# Patient Record
Sex: FEMALE | Race: BLACK OR AFRICAN AMERICAN | Employment: UNEMPLOYED | URBAN - METROPOLITAN AREA
[De-identification: names, ages, dates, MRNs, and addresses within clinical notes are randomized per-mention and may not be internally consistent; named-entity substitution may affect disease eponyms.]

---

## 2017-02-28 ENCOUNTER — OFFICE VISIT (OUTPATIENT)
Dept: FAMILY MEDICINE CLINIC | Age: 42
End: 2017-02-28

## 2017-02-28 VITALS
HEIGHT: 59 IN | RESPIRATION RATE: 18 BRPM | BODY MASS INDEX: 25.08 KG/M2 | TEMPERATURE: 97.8 F | DIASTOLIC BLOOD PRESSURE: 75 MMHG | OXYGEN SATURATION: 98 % | SYSTOLIC BLOOD PRESSURE: 108 MMHG | WEIGHT: 124.38 LBS | HEART RATE: 76 BPM

## 2017-02-28 DIAGNOSIS — Z00.00 ROUTINE GENERAL MEDICAL EXAMINATION AT A HEALTH CARE FACILITY: Primary | ICD-10-CM

## 2017-02-28 DIAGNOSIS — Z23 ENCOUNTER FOR IMMUNIZATION: ICD-10-CM

## 2017-02-28 DIAGNOSIS — R05.9 COUGH: ICD-10-CM

## 2017-02-28 NOTE — MR AVS SNAPSHOT
Visit Information Date & Time Provider Department Dept. Phone Encounter #  
 2/28/2017  8:30 AM Arvind Emery MD [address] [facility] [phone] [id number] Upcoming Health Maintenance Date Due DTaP/Tdap/Td series (1 - Tdap) 1/26/1996 PAP AKA CERVICAL CYTOLOGY 11/1/2019 Allergies as of 2/28/2017  Review Complete On: 2/28/2017 By: Nallely Jarrell MD  
 No Known Allergies Current Immunizations  Reviewed on 2/28/2017 Name Date Influenza Vaccine 11/1/2016 Tdap  Incomplete Reviewed by Nallely Jarrell MD on 2/28/2017 at  9:28 AM  
You Were Diagnosed With   
  
 Codes Comments Routine general medical examination at a health care facility    -  Primary ICD-10-CM: Z00.00 ICD-9-CM: V70.0 Encounter for immunization     ICD-10-CM: K42 ICD-9-CM: V03.89 Cough     ICD-10-CM: R05 ICD-9-CM: 853. 2 Vitals BP  
  
  
  
  
  
 108/75 (BP 1 Location: Left arm, BP Patient Position: Sitting) Vitals History BMI and BSA Data Body Mass Index Body Surface Area  
 25.12 kg/m 2 1.53 m 2 Preferred Pharmacy Pharmacy Name Phone [id number], [address] [place] [address] [phone] Your Updated Medication List  
  
Notice  As of 2/28/2017  9:34 AM  
 You have not been prescribed any medications. We Performed the Following REFERRAL TO ALLERGY [REF5 Custom] TETANUS, DIPHTHERIA TOXOIDS AND ACELLULAR PERTUSSIS VACCINE (TDAP), IN INDIVIDS. >=7, IM [id number] CPT(R)] Referral Information Referral ID Referred By Referred To [id number] Nabeel Tucker MD   
   [address] [place] [address] Phone: [phone] Fax: [phone] Visits Status Start Date End Date 1 New Request 2/28/17 2/28/18  If your referral has a status of pending review or denied, additional information will be sent to support the outcome of this decision. Introducing [address]! Dear Niesha Hannon: 
Thank you for requesting a [employer] account. Our records indicate that you already have an active [employer] account. You can access your account anytime at [email or web address] Did you know that you can access your hospital and ER discharge instructions at any time in [employer]? You can also review all of your test results from your hospital stay or ER visit. Additional Information If you have questions, please visit the Frequently Asked Questions section of the [employer] website at [email or web address] Remember, [employer] is NOT to be used for urgent needs. For medical emergencies, dial 911. Now available from your iPhone and Android! Please provide this summary of care documentation to your next provider. Your primary care clinician is listed as Pretty Emery. If you have any questions after today's visit, please call [phone].

## 2017-02-28 NOTE — PROGRESS NOTES
1. Have you been to the ER, urgent care clinic since your last visit? Hospitalized since your last visit? No    2. Have you seen or consulted any other health care providers outside of the [address] [place] [address] since your last visit? Include any pap smears or colon screening.  No   Chief Complaint   Patient presents with    Complete Physical     CPE_ cpe    Labs     Labs- labs- pt is fasting

## 2017-02-28 NOTE — PROGRESS NOTES
1. Have you been to the ER, urgent care clinic since your last visit? Hospitalized since your last visit? No    2. Have you seen or consulted any other health care providers outside of the [address] [place] [address] since your last visit? Include any pap smears or colon screening. No   Chief Complaint   Patient presents with    Complete Physical     CPE_ cpe    Labs     Labs- labs- pt is fasting         This is a Subsequent Medicare Annual Wellness Visit providing Personalized Prevention Plan Services (PPPS) (Performed 12 months after initial AWV and PPPS )    I have reviewed the patient's medical history in detail and updated the computerized patient record. History     Past Medical History:   Diagnosis Date    Moderate mental retardation       History reviewed. No pertinent surgical history. No Known Allergies  Family History   Problem Relation Age of Onset    Family history unknown: Yes     Social History   Substance Use Topics    Smoking status: Never Smoker    Smokeless tobacco: Never Used    Alcohol use No     There is no problem list on file for this patient. Depression Risk Factor Screening:     PHQ 2 / 9, over the last two weeks 2/28/2017   Little interest or pleasure in doing things Not at all   Feeling down, depressed or hopeless Not at all   Total Score PHQ 2 0     Alcohol Risk Factor Screening:   Pt drinks very rarely    Functional Ability and Level of Safety:     Hearing Loss   normal-to-mild    Activities of Daily Living   Self-care. Requires assistance with: no ADLs    Fall Risk   No flowsheet data found. Abuse Screen   Patient is not abused    Review of Systems   A comprehensive review of systems was negative except for that written in the HPI.     Physical Examination     Evaluation of Cognitive Function:  Mood/affect:  happy  Appearance: age appropriate  Family member/caregiver input: Pt lives with her parents    Visit Vitals    /75 (BP 1 Location: Left arm, BP Patient Position: Sitting)    Pulse 76    Temp 97.8 °F (36.6 °C) (Oral)    Resp 18    Ht 4' 11\" (1.499 m)    Wt 124 lb 6 oz (56.4 kg)    SpO2 98%    BMI 25.12 kg/m2     General appearance: alert, cooperative, no distress, appears stated age  Head: Normocephalic, without obvious abnormality, atraumatic  Throat: Lips, mucosa, and tongue normal. Teeth and gums normal  Lungs: clear to auscultation bilaterally  Heart: regular rate and rhythm, S1, S2 normal, no murmur, click, rub or gallop  Extremities: extremities normal, atraumatic, no cyanosis or edema    Patient Care Team:  August Tinsley MD as PCP - General (Family Practice)    Advice/Referrals/Counseling   Education and counseling provided:  Are appropriate based on today's review and evaluation      Assessment/Plan       ICD-10-CM ICD-9-CM    1. Routine general medical examination at a health care facility Z00.00 V70.0    2. Encounter for immunization Z23 V03.89 TETANUS, DIPHTHERIA TOXOIDS AND ACELLULAR PERTUSSIS VACCINE (TDAP), IN INDIVIDS. >=7, IM   3. Cough R05 786.2 REFERRAL TO ALLERGY     Encounter Diagnoses   Name Primary?  Routine general medical examination at a health care facility Yes    Encounter for immunization     Cough      Orders Placed This Encounter    Tetanus, diphtheria toxoids and acellular pertussis (TDAP) vaccine, in individuals >=7 years, IM    REFERRAL TO ALLERGY   .

## 2017-03-01 LAB
25(OH)D3+25(OH)D2 SERPL-MCNC: 58.5 NG/ML (ref 30–100)
ALBUMIN SERPL-MCNC: 4.6 G/DL (ref 3.5–5.5)
ALBUMIN/GLOB SERPL: 2.1 {RATIO} (ref 1.1–2.5)
ALP SERPL-CCNC: 68 IU/L (ref 39–117)
ALT SERPL-CCNC: 13 IU/L (ref 0–32)
AST SERPL-CCNC: 16 IU/L (ref 0–40)
BASOPHILS # BLD AUTO: 0 X10E3/UL (ref 0–0.2)
BASOPHILS NFR BLD AUTO: 1 %
BILIRUB SERPL-MCNC: 1.3 MG/DL (ref 0–1.2)
BUN SERPL-MCNC: 10 MG/DL (ref 6–24)
BUN/CREAT SERPL: 12 (ref 9–23)
CALCIUM SERPL-MCNC: 9.4 MG/DL (ref 8.7–10.2)
CHLORIDE SERPL-SCNC: 100 MMOL/L (ref 96–106)
CHOLEST SERPL-MCNC: 225 MG/DL (ref 100–199)
CO2 SERPL-SCNC: 23 MMOL/L (ref 18–29)
CREAT SERPL-MCNC: 0.86 MG/DL (ref 0.57–1)
EOSINOPHIL # BLD AUTO: 0 X10E3/UL (ref 0–0.4)
EOSINOPHIL NFR BLD AUTO: 1 %
ERYTHROCYTE [DISTWIDTH] IN BLOOD BY AUTOMATED COUNT: 12.9 % (ref 12.3–15.4)
EST. AVERAGE GLUCOSE BLD GHB EST-MCNC: 100 MG/DL
GLOBULIN SER CALC-MCNC: 2.2 G/DL (ref 1.5–4.5)
GLUCOSE SERPL-MCNC: 77 MG/DL (ref 65–99)
HBA1C MFR BLD: 5.1 % (ref 4.8–5.6)
HCT VFR BLD AUTO: 41.9 % (ref 34–46.6)
HDLC SERPL-MCNC: 83 MG/DL
HGB BLD-MCNC: 13.6 G/DL (ref 11.1–15.9)
IMM GRANULOCYTES # BLD: 0 X10E3/UL (ref 0–0.1)
IMM GRANULOCYTES NFR BLD: 0 %
INTERPRETATION, 910389: NORMAL
LDLC SERPL CALC-MCNC: 132 MG/DL (ref 0–99)
LYMPHOCYTES # BLD AUTO: 1.8 X10E3/UL (ref 0.7–3.1)
LYMPHOCYTES NFR BLD AUTO: 49 %
MCH RBC QN AUTO: 31.3 PG (ref 26.6–33)
MCHC RBC AUTO-ENTMCNC: 32.5 G/DL (ref 31.5–35.7)
MCV RBC AUTO: 97 FL (ref 79–97)
MONOCYTES # BLD AUTO: 0.3 X10E3/UL (ref 0.1–0.9)
MONOCYTES NFR BLD AUTO: 8 %
NEUTROPHILS # BLD AUTO: 1.5 X10E3/UL (ref 1.4–7)
NEUTROPHILS NFR BLD AUTO: 41 %
PLATELET # BLD AUTO: 357 X10E3/UL (ref 150–379)
POTASSIUM SERPL-SCNC: 4.8 MMOL/L (ref 3.5–5.2)
PROT SERPL-MCNC: 6.8 G/DL (ref 6–8.5)
RBC # BLD AUTO: 4.34 X10E6/UL (ref 3.77–5.28)
SODIUM SERPL-SCNC: 138 MMOL/L (ref 134–144)
TRIGL SERPL-MCNC: 50 MG/DL (ref 0–149)
TSH SERPL DL<=0.005 MIU/L-ACNC: 1.48 UIU/ML (ref 0.45–4.5)
VLDLC SERPL CALC-MCNC: 10 MG/DL (ref 5–40)
WBC # BLD AUTO: 3.7 X10E3/UL (ref 3.4–10.8)

## 2017-03-01 NOTE — PROGRESS NOTES
Cholesterol is elevated, please closely monitor diet and increase exercise, recheck in 6 months. All other labs are within normal limits. A message has been sent in "[employer]" and the lab work released to the patient.

## 2017-06-07 PROBLEM — E78.00 ELEVATED CHOLESTEROL: Status: ACTIVE | Noted: 2017-06-07

## 2018-02-28 ENCOUNTER — OFFICE VISIT (OUTPATIENT)
Dept: FAMILY MEDICINE CLINIC | Age: 43
End: 2018-02-28

## 2018-02-28 VITALS
SYSTOLIC BLOOD PRESSURE: 106 MMHG | DIASTOLIC BLOOD PRESSURE: 70 MMHG | HEART RATE: 68 BPM | HEIGHT: 59 IN | RESPIRATION RATE: 17 BRPM | OXYGEN SATURATION: 99 % | WEIGHT: 117 LBS | TEMPERATURE: 98 F | BODY MASS INDEX: 23.59 KG/M2

## 2018-02-28 DIAGNOSIS — Z00.00 MEDICARE ANNUAL WELLNESS VISIT, SUBSEQUENT: ICD-10-CM

## 2018-02-28 DIAGNOSIS — E78.00 ELEVATED CHOLESTEROL: Primary | ICD-10-CM

## 2018-02-28 RX ORDER — MELATONIN
DAILY
COMMUNITY

## 2018-02-28 NOTE — PROGRESS NOTES
Chief Complaint   Patient presents with    Complete Physical     form for special olympics.      Labs

## 2018-02-28 NOTE — MR AVS SNAPSHOT
[address] [address] 
[phone] Patient: Bautista Mitchell MRN: [id number] :1975 Visit Information Date & Time Provider Department Dept. Phone Encounter #  
 2018  8:50 AM Cass Mosqueda MD [address] [facility] [phone] [id number] Upcoming Health Maintenance Date Due  
 PAP AKA CERVICAL CYTOLOGY 2019 DTaP/Tdap/Td series (2 - Td) 2027 Allergies as of 2018  Review Complete On: 2018 By: Cass Mosqueda MD  
 No Known Allergies Current Immunizations  Reviewed on 2018 Name Date Influenza Vaccine 10/29/2017, 10/11/2017, 2016 Tdap 2017 Reviewed by Cass Mosqueda MD on 2018 at  9:19 AM  
You Were Diagnosed With   
  
 Codes Comments Elevated cholesterol    -  Primary ICD-10-CM: E78.00 ICD-9-CM: 272.0 Medicare annual wellness visit, subsequent     ICD-10-CM: Z00.00 ICD-9-CM: V70.0 Vitals BP Pulse Temp Resp Height(growth percentile) Weight(growth percentile) 106/70 68 98 °F (36.7 °C) 17 4' 11\" (1.499 m) 117 lb (53.1 kg) SpO2 BMI OB Status Smoking Status 99% 23.63 kg/m2 IUD Never Smoker Vitals History BMI and BSA Data Body Mass Index Body Surface Area  
 23.63 kg/m 2 1.49 m 2 Preferred Pharmacy Pharmacy Name Phone [id number] [place], [address] [facility] [phone] Your Updated Medication List  
  
   
This list is accurate as of 18  9:24 AM.  Always use your most recent med list.  
  
  
  
  
 VITAMIN D3 1,000 unit tablet Generic drug:  cholecalciferol Take  by mouth daily. We Performed the Following CBC WITH AUTOMATED DIFF [98991 CPT(R)] LIPID PANEL [70862 CPT(R)] METABOLIC PANEL, COMPREHENSIVE [04040 CPT(R)] TSH 3RD GENERATION [76461 CPT(R)] Patient Instructions Medicare Wellness Visit, Female The best way to live healthy is to have a healthy lifestyle by eating a well-balanced diet, exercising regularly, limiting alcohol and stopping smoking. Regular physical exams and screening tests are another way to keep healthy. Preventive exams provided by your health care provider can find health problems before they become diseases or illnesses. Preventive services including immunizations, screening tests, monitoring and exams can help you take care of your own health. All people over age 72 should have a pneumovax  and and a prevnar shot to prevent pneumonia. These are once in a lifetime unless you and your provider decide differently. All people over 65 should have a yearly flu shot and a tetanus vaccine every 10 years. A bone mass density to screen for osteoporosis or thinning of the bones should be done every 2 years after 65. Screening for diabetes mellitus with a blood sugar test should be done every year. Glaucoma is a disease of the eye due to increased ocular pressure that can lead to blindness and it should be done every year by an eye professional. 
 
Cardiovascular screening tests that check for elevated lipids (fatty part of blood) which can lead to heart disease and strokes should be done every 5 years. Colorectal screening that evaluates for blood or polyps in your colon should be done yearly as a stool test or every five years as a flexible sigmoidoscope or every 10 years as a colonoscopy up to age 76. Breast cancer screening with a mammogram is recommended biennially  for women age 54-69. Screening for cervical cancer with a pap smear and pelvic exam is recommended for women after age 72 years every 2 years up to age 79 or when the provider and patient decide to stop. If there is a history of cervical abnormalities or other increased risk for cancer then the test is recommended yearly. Hepatitis C screening is also recommended for anyone born between 80 through [address]. A shingles vaccine is also recommended once in a lifetime after age 61. Your Medicare Wellness Exam is recommended annually. Here is a list of your current Health Maintenance items with a due date: There are no preventive care reminders to display for this patient. Introducing [facility]! Dear Omar Bah: 
Thank you for requesting a [employer] account. Our records indicate that you already have an active [employer] account. You can access your account anytime at [email or web address] Did you know that you can access your hospital and ER discharge instructions at any time in [employer]? You can also review all of your test results from your hospital stay or ER visit. Additional Information If you have questions, please visit the Frequently Asked Questions section of the [employer] website at [email or web address] Remember, [employer] is NOT to be used for urgent needs. For medical emergencies, dial 911. Now available from your iPhone and Android! Please provide this summary of care documentation to your next provider. Your primary care clinician is listed as Pretty Emery. If you have any questions after today's visit, please call [phone].

## 2018-02-28 NOTE — PROGRESS NOTES
Chief Complaint   Patient presents with    Complete Physical     form for special olympics.  Labs          This is a Subsequent Medicare Annual Wellness Exam (AWV) (Performed 12 months after IPPE or effective date of Medicare Part B enrollment)    I have reviewed the patient's medical history in detail and updated the computerized patient record. History     Past Medical History:   Diagnosis Date    Moderate mental retardation       History reviewed. No pertinent surgical history. Current Outpatient Prescriptions   Medication Sig Dispense Refill    cholecalciferol (VITAMIN D3) 1,000 unit tablet Take  by mouth daily. No Known Allergies  Family History   Problem Relation Age of Onset    Family history unknown: Yes     Social History   Substance Use Topics    Smoking status: Never Smoker    Smokeless tobacco: Never Used    Alcohol use No     Patient Active Problem List   Diagnosis Code    Elevated cholesterol E78.00       Depression Risk Factor Screening:     PHQ over the last two weeks 2/28/2018   Little interest or pleasure in doing things Not at all   Feeling down, depressed or hopeless Not at all   Total Score PHQ 2 0     Alcohol Risk Factor Screening: You do not drink alcohol or very rarely. Functional Ability and Level of Safety:   Hearing Loss  Hearing is good. Activities of Daily Living  The home contains: no safety equipment. Patient needs help with:  transportation, preparing meals, managing medications and managing money    Fall Risk  No flowsheet data found.     Abuse Screen  Patient is not abused    Cognitive Screening   Evaluation of Cognitive Function:  Has your family/caregiver stated any concerns about your memory: no  Normal    Patient Care Team   Patient Care Team:  Jose Miguel Parham MD as PCP - General (Family Practice)    Physical Examination: General appearance - alert, well appearing, and in no distress  Mental status - normal mood, behavior, speech, dress, motor activity, and thought processes  Mouth - mucous membranes moist, pharynx normal without lesions  Chest - clear to auscultation, no wheezes, rales or rhonchi, symmetric air entry  Heart - normal rate, regular rhythm, normal S1, S2, no murmurs, rubs, clicks or gallops  Musculoskeletal - no joint tenderness, deformity or swelling    Assessment/Plan   Education and counseling provided:  Are appropriate based on today's review and evaluation    Diagnoses and all orders for this visit:    1. Elevated cholesterol  -     METABOLIC PANEL, COMPREHENSIVE  -     CBC WITH AUTOMATED DIFF  -     LIPID PANEL  -     TSH 3RD GENERATION    2. Medicare annual wellness visit, subsequent        There are no preventive care reminders to display for this patient.

## 2018-03-01 LAB
ALBUMIN SERPL-MCNC: 4.4 G/DL (ref 3.5–5.5)
ALBUMIN/GLOB SERPL: 1.8 {RATIO} (ref 1.2–2.2)
ALP SERPL-CCNC: 59 IU/L (ref 39–117)
ALT SERPL-CCNC: 13 IU/L (ref 0–32)
AST SERPL-CCNC: 15 IU/L (ref 0–40)
BASOPHILS # BLD AUTO: 0 X10E3/UL (ref 0–0.2)
BASOPHILS NFR BLD AUTO: 1 %
BILIRUB SERPL-MCNC: 1.2 MG/DL (ref 0–1.2)
BUN SERPL-MCNC: 11 MG/DL (ref 6–24)
BUN/CREAT SERPL: 12 (ref 9–23)
CALCIUM SERPL-MCNC: 9.4 MG/DL (ref 8.7–10.2)
CHLORIDE SERPL-SCNC: 99 MMOL/L (ref 96–106)
CHOLEST SERPL-MCNC: 180 MG/DL (ref 100–199)
CO2 SERPL-SCNC: 24 MMOL/L (ref 18–29)
CREAT SERPL-MCNC: 0.94 MG/DL (ref 0.57–1)
EOSINOPHIL # BLD AUTO: 0.1 X10E3/UL (ref 0–0.4)
EOSINOPHIL NFR BLD AUTO: 2 %
ERYTHROCYTE [DISTWIDTH] IN BLOOD BY AUTOMATED COUNT: 13.1 % (ref 12.3–15.4)
GFR SERPLBLD CREATININE-BSD FMLA CKD-EPI: 75 ML/MIN/1.73
GFR SERPLBLD CREATININE-BSD FMLA CKD-EPI: 86 ML/MIN/1.73
GLOBULIN SER CALC-MCNC: 2.4 G/DL (ref 1.5–4.5)
GLUCOSE SERPL-MCNC: 79 MG/DL (ref 65–99)
HCT VFR BLD AUTO: 41.3 % (ref 34–46.6)
HDLC SERPL-MCNC: 75 MG/DL
HGB BLD-MCNC: 13.6 G/DL (ref 11.1–15.9)
IMM GRANULOCYTES # BLD: 0 X10E3/UL (ref 0–0.1)
IMM GRANULOCYTES NFR BLD: 0 %
INTERPRETATION, 910389: NORMAL
LDLC SERPL CALC-MCNC: 94 MG/DL (ref 0–99)
LYMPHOCYTES # BLD AUTO: 2.2 X10E3/UL (ref 0.7–3.1)
LYMPHOCYTES NFR BLD AUTO: 54 %
MCH RBC QN AUTO: 31.1 PG (ref 26.6–33)
MCHC RBC AUTO-ENTMCNC: 32.9 G/DL (ref 31.5–35.7)
MCV RBC AUTO: 95 FL (ref 79–97)
MONOCYTES # BLD AUTO: 0.4 X10E3/UL (ref 0.1–0.9)
MONOCYTES NFR BLD AUTO: 10 %
NEUTROPHILS # BLD AUTO: 1.4 X10E3/UL (ref 1.4–7)
NEUTROPHILS NFR BLD AUTO: 33 %
PLATELET # BLD AUTO: 316 X10E3/UL (ref 150–379)
POTASSIUM SERPL-SCNC: 4.7 MMOL/L (ref 3.5–5.2)
PROT SERPL-MCNC: 6.8 G/DL (ref 6–8.5)
RBC # BLD AUTO: 4.37 X10E6/UL (ref 3.77–5.28)
SODIUM SERPL-SCNC: 138 MMOL/L (ref 134–144)
TRIGL SERPL-MCNC: 54 MG/DL (ref 0–149)
TSH SERPL DL<=0.005 MIU/L-ACNC: 1.3 UIU/ML (ref 0.45–4.5)
VLDLC SERPL CALC-MCNC: 11 MG/DL (ref 5–40)
WBC # BLD AUTO: 4.1 X10E3/UL (ref 3.4–10.8)

## 2018-03-02 NOTE — PROGRESS NOTES
Cholesterol has greatly improved- keep up the good work! All other labs are within normal limits. A message has been sent in [employer] and the lab work released to the patient.

## 2019-03-05 ENCOUNTER — OFFICE VISIT (OUTPATIENT)
Dept: FAMILY MEDICINE CLINIC | Age: 44
End: 2019-03-05

## 2019-03-05 VITALS
OXYGEN SATURATION: 98 % | RESPIRATION RATE: 15 BRPM | HEIGHT: 59 IN | TEMPERATURE: 98 F | WEIGHT: 121 LBS | DIASTOLIC BLOOD PRESSURE: 66 MMHG | BODY MASS INDEX: 24.39 KG/M2 | HEART RATE: 70 BPM | SYSTOLIC BLOOD PRESSURE: 107 MMHG

## 2019-03-05 DIAGNOSIS — Z00.00 MEDICARE ANNUAL WELLNESS VISIT, SUBSEQUENT: ICD-10-CM

## 2019-03-05 DIAGNOSIS — Z00.00 ROUTINE GENERAL MEDICAL EXAMINATION AT A HEALTH CARE FACILITY: Primary | ICD-10-CM

## 2019-03-05 DIAGNOSIS — E78.00 ELEVATED CHOLESTEROL: ICD-10-CM

## 2019-03-05 DIAGNOSIS — Z13.39 SCREENING FOR ALCOHOLISM: ICD-10-CM

## 2019-03-05 DIAGNOSIS — Z71.84 TRAVEL ADVICE ENCOUNTER: ICD-10-CM

## 2019-03-05 RX ORDER — CIPROFLOXACIN 500 MG/1
500 TABLET ORAL 2 TIMES DAILY
Qty: 20 TAB | Refills: 0 | Status: SHIPPED | OUTPATIENT
Start: 2019-03-05 | End: 2019-03-15

## 2019-03-05 NOTE — PROGRESS NOTES
Chief Complaint   Patient presents with   Anatoly Physical    Labs    Request For New Medication     Abt. Taking trip to [address] [place] [address] seen in the office today accompanied by her father for a physical with fasting labs  Pt's father is requesting a abt to take on there trip to the [address] [place] [address] Road in case something happens\". Also needing copy of physical, no form provided. 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 Taking trip to [place]     she is a 40y.o. year old female who presents for evaluation. Reviewed PmHx, RxHx, FmHx, SocHx, AllgHx and updated in chart. Review of Systems - negative except as listed above    Objective:     Vitals:    03/05/19 0833   BP: 107/66   Pulse: 70   Resp: 15   Temp: 98 °F (36.7 °C)   TempSrc: Oral   SpO2: 98%   Weight: 121 lb (54.9 kg)   Height: 4' 11\" (1.499 m)     Physical Examination: General appearance - alert, well appearing, and in no distress  Mental status - normal mood, behavior, speech, dress, motor activity, and thought processes  Mouth - mucous membranes moist, pharynx normal without lesions  Chest - clear to auscultation, no wheezes, rales or rhonchi, symmetric air entry  Heart - normal rate, regular rhythm, normal S1, S2, no murmurs, rubs, clicks or gallops  Musculoskeletal - no joint tenderness, deformity or swelling  Extremities - peripheral pulses normal, no pedal edema, no clubbing or cyanosis    Assessment/ Plan:   1. Elevated cholesterol  -check fasting  - METABOLIC PANEL, COMPREHENSIVE  - CBC W/O DIFF  - LIPID PANEL  - HEMOGLOBIN A1C WITH EAG  - TSH 3RD GENERATION  - VITAMIN D, 25 HYDROXY    2. Travel advice encounter  Take as written  - typhoid vaccin,live,attenuated SR capsule;  One capsule on alternate days (day 1, 3, 5, and 7) for a total of 4 doses; all doses should be complete 1 week prior to travel  Dispense: 4 Cap; Refill: 0  - ciprofloxacin HCl (CIPRO) 500 mg tablet; Take 1 Tab by mouth two (2) times a day for 10 days. Dispense: 20 Tab; Refill: 0    3. Routine Exam  -reviewed preventative health     Follow-up Disposition: As needed  I have discussed the diagnosis with the patient and the intended plan as seen in the above orders. The patient has received an after-visit summary and questions were answered concerning future plans. Medication Side Effects and Warnings were discussed with patient: yes  Patient Labs were reviewed: yes  Patient Past Records were reviewed:  yes    Gary Ojeda M.D. This is the Subsequent Medicare Annual Wellness Exam, performed 12 months or more after the Initial AWV or the last Subsequent AWV    I have reviewed the patient's medical history in detail and updated the computerized patient record. History     Past Medical History:   Diagnosis Date    Moderate mental retardation       History reviewed. No pertinent surgical history. Current Outpatient Medications   Medication Sig Dispense Refill    typhoid vaccin,live,attenuated SR capsule One capsule on alternate days (day 1, 3, 5, and 7) for a total of 4 doses; all doses should be complete 1 week prior to travel 4 Cap 0    ciprofloxacin HCl (CIPRO) 500 mg tablet Take 1 Tab by mouth two (2) times a day for 10 days. 20 Tab 0    cholecalciferol (VITAMIN D3) 1,000 unit tablet Take  by mouth daily.        No Known Allergies  Family History   Family history unknown: Yes     Social History     Tobacco Use    Smoking status: Never Smoker    Smokeless tobacco: Never Used   Substance Use Topics    Alcohol use: No     Patient Active Problem List   Diagnosis Code    Elevated cholesterol E78.00       Depression Risk Factor Screening:     3 most recent PHQ Screens 2/28/2018   Little interest or pleasure in doing things Not at all   Feeling down, depressed, irritable, or hopeless Not at all   Total Score PHQ 2 0 Alcohol Risk Factor Screening: You do not drink alcohol or very rarely. Functional Ability and Level of Safety:   Hearing Loss  Hearing is good. Activities of Daily Living  The home contains: no safety equipment. Patient needs help with:  transportation, shopping, managing medications and managing money    Fall Risk  No flowsheet data found. Abuse Screen  Patient is not abused    Cognitive Screening   Evaluation of Cognitive Function:  Has your family/caregiver stated any concerns about your memory: no  Normal    Patient Care Team   Patient Care Team:  Karen Clark MD as PCP - General (Family Practice)    Assessment/Plan   Education and counseling provided:  Are appropriate based on today's review and evaluation    Diagnoses and all orders for this visit:    1. Routine general medical examination at a health care facility    2. Elevated cholesterol  -     METABOLIC PANEL, COMPREHENSIVE  -     CBC W/O DIFF  -     LIPID PANEL  -     HEMOGLOBIN A1C WITH EAG  -     TSH 3RD GENERATION  -     VITAMIN D, 25 HYDROXY    3. Travel advice encounter  -     typhoid vaccin,live,attenuated SR capsule; One capsule on alternate days (day 1, 3, 5, and 7) for a total of 4 doses; all doses should be complete 1 week prior to travel  -     ciprofloxacin HCl (CIPRO) 500 mg tablet; Take 1 Tab by mouth two (2) times a day for 10 days. 4. Medicare annual wellness visit, subsequent    5.  Screening for alcoholism  -     MO ANNUAL ALCOHOL SCREEN 15 MIN        Health Maintenance Due   Topic Date Due    MEDICARE YEARLY EXAM  03/01/2019

## 2019-03-05 NOTE — PATIENT INSTRUCTIONS
Medicare Wellness Visit, Female     The best way to live healthy is to have a lifestyle where you eat a well-balanced diet, exercise regularly, limit alcohol use, and quit all forms of tobacco/nicotine, if applicable. Regular preventive services are another way to keep healthy. Preventive services (vaccines, screening tests, monitoring & exams) can help personalize your care plan, which helps you manage your own care. Screening tests can find health problems at the earliest stages, when they are easiest to treat. [facility] follows the current, evidence-based guidelines published by the [facility] ([facility]) when recommending preventive services for our patients. Because we follow these guidelines, sometimes recommendations change over time as research supports it. (For example, mammograms used to be recommended annually. Even though Medicare will still pay for an annual mammogram, the newer guidelines recommend a mammogram every two years for women of average risk.)  Of course, you and your doctor may decide to screen more often for some diseases, based on your risk and your health status. Preventive services for you include:  - Medicare offers their members a free annual wellness visit, which is time for you and your primary care provider to discuss and plan for your preventive service needs. Take advantage of this benefit every year!  -All adults over the age of 72 should receive the recommended pneumonia vaccines. Current USPSTF guidelines recommend a series of two vaccines for the best pneumonia protection.   -All adults should have a flu vaccine yearly and a tetanus vaccine every 10 years. All adults age 61 and older should receive a shingles vaccine once in their lifetime.    -A bone mass density test is recommended when a woman turns 65 to screen for osteoporosis. This test is only recommended one time, as a screening.  Some providers will use this same test as a disease monitoring tool if you already have osteoporosis. -All adults age 38-68 who are overweight should have a diabetes screening test once every three years.   -Other screening tests and preventive services for persons with diabetes include: an eye exam to screen for diabetic retinopathy, a kidney function test, a foot exam, and stricter control over your cholesterol.   -Cardiovascular screening for adults with routine risk involves an electrocardiogram (ECG) at intervals determined by your doctor.   -Colorectal cancer screenings should be done for adults age 54-65 with no increased risk factors for colorectal cancer. There are a number of acceptable methods of screening for this type of cancer. Each test has its own benefits and drawbacks. Discuss with your doctor what is most appropriate for you during your annual wellness visit. The different tests include: colonoscopy (considered the best screening method), a fecal occult blood test, a fecal DNA test, and sigmoidoscopy. -Breast cancer screenings are recommended every other year for women of normal risk, age 54-69.  -Cervical cancer screenings for women over age 72 are only recommended with certain risk factors.   -All adults born between [facility] should be screened once for Hepatitis C.      Here is a list of your current Health Maintenance items (your personalized list of preventive services) with a due date:  Health Maintenance Due   Topic Date Due    Annual Well Visit  03/01/2019

## 2019-03-06 LAB
25(OH)D3+25(OH)D2 SERPL-MCNC: 47.4 NG/ML (ref 30–100)
ALBUMIN SERPL-MCNC: 4.3 G/DL (ref 3.5–5.5)
ALBUMIN/GLOB SERPL: 1.7 {RATIO} (ref 1.2–2.2)
ALP SERPL-CCNC: 65 IU/L (ref 39–117)
ALT SERPL-CCNC: 7 IU/L (ref 0–32)
AST SERPL-CCNC: 13 IU/L (ref 0–40)
BILIRUB SERPL-MCNC: 1.3 MG/DL (ref 0–1.2)
BUN SERPL-MCNC: 6 MG/DL (ref 6–24)
BUN/CREAT SERPL: 7 (ref 9–23)
CALCIUM SERPL-MCNC: 9.1 MG/DL (ref 8.7–10.2)
CHLORIDE SERPL-SCNC: 101 MMOL/L (ref 96–106)
CHOLEST SERPL-MCNC: 167 MG/DL (ref 100–199)
CO2 SERPL-SCNC: 26 MMOL/L (ref 20–29)
CREAT SERPL-MCNC: 0.92 MG/DL (ref 0.57–1)
ERYTHROCYTE [DISTWIDTH] IN BLOOD BY AUTOMATED COUNT: 12.8 % (ref 12.3–15.4)
EST. AVERAGE GLUCOSE BLD GHB EST-MCNC: 91 MG/DL
GLOBULIN SER CALC-MCNC: 2.5 G/DL (ref 1.5–4.5)
GLUCOSE SERPL-MCNC: 76 MG/DL (ref 65–99)
HBA1C MFR BLD: 4.8 % (ref 4.8–5.6)
HCT VFR BLD AUTO: 40.8 % (ref 34–46.6)
HDLC SERPL-MCNC: 69 MG/DL
HGB BLD-MCNC: 13.3 G/DL (ref 11.1–15.9)
INTERPRETATION, 910389: NORMAL
LDLC SERPL CALC-MCNC: 87 MG/DL (ref 0–99)
MCH RBC QN AUTO: 31.4 PG (ref 26.6–33)
MCHC RBC AUTO-ENTMCNC: 32.6 G/DL (ref 31.5–35.7)
MCV RBC AUTO: 97 FL (ref 79–97)
PLATELET # BLD AUTO: 326 X10E3/UL (ref 150–379)
POTASSIUM SERPL-SCNC: 4.3 MMOL/L (ref 3.5–5.2)
PROT SERPL-MCNC: 6.8 G/DL (ref 6–8.5)
RBC # BLD AUTO: 4.23 X10E6/UL (ref 3.77–5.28)
SODIUM SERPL-SCNC: 139 MMOL/L (ref 134–144)
TRIGL SERPL-MCNC: 53 MG/DL (ref 0–149)
TSH SERPL DL<=0.005 MIU/L-ACNC: 1.43 UIU/ML (ref 0.45–4.5)
VLDLC SERPL CALC-MCNC: 11 MG/DL (ref 5–40)
WBC # BLD AUTO: 6.8 X10E3/UL (ref 3.4–10.8)

## 2019-03-06 NOTE — PROGRESS NOTES
DERRICK  A message has been sent in [employer] and the lab work released to the patient.

## 2020-03-06 ENCOUNTER — HOSPITAL ENCOUNTER (OUTPATIENT)
Dept: LAB | Age: 45
Discharge: HOME | End: 2020-03-06

## 2020-03-06 ENCOUNTER — OFFICE VISIT (OUTPATIENT)
Dept: FAMILY MEDICINE CLINIC | Age: 45
End: 2020-03-06

## 2020-03-06 VITALS
SYSTOLIC BLOOD PRESSURE: 116 MMHG | OXYGEN SATURATION: 100 % | TEMPERATURE: 97.8 F | BODY MASS INDEX: 25.8 KG/M2 | DIASTOLIC BLOOD PRESSURE: 73 MMHG | WEIGHT: 128 LBS | RESPIRATION RATE: 18 BRPM | HEIGHT: 59 IN | HEART RATE: 78 BPM

## 2020-03-06 DIAGNOSIS — E78.00 ELEVATED CHOLESTEROL: ICD-10-CM

## 2020-03-06 DIAGNOSIS — R41.89 COGNITIVE DECLINE: ICD-10-CM

## 2020-03-06 DIAGNOSIS — F71 MODERATE INTELLECTUAL DISABILITY: ICD-10-CM

## 2020-03-06 DIAGNOSIS — R41.89 COGNITIVE CHANGES: ICD-10-CM

## 2020-03-06 DIAGNOSIS — Z02.5 ENCOUNTER FOR EXAMINATION TO PARTICIPATE IN SPECIAL OLYMPICS: ICD-10-CM

## 2020-03-06 DIAGNOSIS — R48.2 APRAXIA: ICD-10-CM

## 2020-03-06 DIAGNOSIS — Z00.00 ROUTINE GENERAL MEDICAL EXAMINATION AT A HEALTH CARE FACILITY: ICD-10-CM

## 2020-03-06 DIAGNOSIS — Z00.00 MEDICARE ANNUAL WELLNESS VISIT, SUBSEQUENT: Primary | ICD-10-CM

## 2020-03-06 DIAGNOSIS — R05.3 CHRONIC COUGH: ICD-10-CM

## 2020-03-06 LAB
25(OH)D3 SERPL-MCNC: 52.9 NG/ML (ref 30–100)
ALBUMIN SERPL-MCNC: 3.9 G/DL (ref 3.5–5)
ALBUMIN/GLOB SERPL: 1.3 {RATIO} (ref 1.1–2.2)
ALP SERPL-CCNC: 61 U/L (ref 45–117)
ALT SERPL-CCNC: 22 U/L (ref 12–78)
ANION GAP SERPL CALC-SCNC: 4 MMOL/L (ref 5–15)
AST SERPL-CCNC: 10 U/L (ref 15–37)
BASOPHILS # BLD: 0.1 K/UL (ref 0–0.1)
BASOPHILS NFR BLD: 1 % (ref 0–1)
BILIRUB SERPL-MCNC: 1.3 MG/DL (ref 0.2–1)
BUN SERPL-MCNC: 9 MG/DL (ref 6–20)
BUN/CREAT SERPL: 9 (ref 12–20)
CALCIUM SERPL-MCNC: 9.1 MG/DL (ref 8.5–10.1)
CHLORIDE SERPL-SCNC: 105 MMOL/L (ref 97–108)
CHOLEST SERPL-MCNC: 219 MG/DL
CO2 SERPL-SCNC: 28 MMOL/L (ref 21–32)
CREAT SERPL-MCNC: 0.97 MG/DL (ref 0.55–1.02)
DIFFERENTIAL METHOD BLD: ABNORMAL
EOSINOPHIL # BLD: 0.1 K/UL (ref 0–0.4)
EOSINOPHIL NFR BLD: 1 % (ref 0–7)
ERYTHROCYTE [DISTWIDTH] IN BLOOD BY AUTOMATED COUNT: 13 % (ref 11.5–14.5)
GLOBULIN SER CALC-MCNC: 3 G/DL (ref 2–4)
GLUCOSE SERPL-MCNC: 79 MG/DL (ref 65–100)
HCT VFR BLD AUTO: 40.1 % (ref 35–47)
HDLC SERPL-MCNC: 87 MG/DL
HDLC SERPL: 2.5 {RATIO} (ref 0–5)
HGB BLD-MCNC: 13.1 G/DL (ref 11.5–16)
IMM GRANULOCYTES # BLD AUTO: 0 K/UL (ref 0–0.04)
IMM GRANULOCYTES NFR BLD AUTO: 0 % (ref 0–0.5)
LDLC SERPL CALC-MCNC: 120 MG/DL (ref 0–100)
LIPID PROFILE,FLP: ABNORMAL
LYMPHOCYTES # BLD: 2.4 K/UL (ref 0.8–3.5)
LYMPHOCYTES NFR BLD: 52 % (ref 12–49)
MCH RBC QN AUTO: 31.8 PG (ref 26–34)
MCHC RBC AUTO-ENTMCNC: 32.7 G/DL (ref 30–36.5)
MCV RBC AUTO: 97.3 FL (ref 80–99)
MONOCYTES # BLD: 0.4 K/UL (ref 0–1)
MONOCYTES NFR BLD: 9 % (ref 5–13)
NEUTS SEG # BLD: 1.7 K/UL (ref 1.8–8)
NEUTS SEG NFR BLD: 37 % (ref 32–75)
NRBC # BLD: 0 K/UL (ref 0–0.01)
NRBC BLD-RTO: 0 PER 100 WBC
PLATELET # BLD AUTO: 306 K/UL (ref 150–400)
PMV BLD AUTO: 10.8 FL (ref 8.9–12.9)
POC BOTH EYES RESULT, BOTHEYE: NORMAL
POC LEFT EYE RESULT, LFTEYE: NORMAL
POC RIGHT EYE RESULT, RGTEYE: NORMAL
POTASSIUM SERPL-SCNC: 4.2 MMOL/L (ref 3.5–5.1)
PROT SERPL-MCNC: 6.9 G/DL (ref 6.4–8.2)
RBC # BLD AUTO: 4.12 M/UL (ref 3.8–5.2)
SODIUM SERPL-SCNC: 137 MMOL/L (ref 136–145)
TRIGL SERPL-MCNC: 60 MG/DL (ref ?–150)
TSH SERPL DL<=0.05 MIU/L-ACNC: 1.16 UIU/ML (ref 0.36–3.74)
VIT B12 SERPL-MCNC: 368 PG/ML (ref 193–986)
VLDLC SERPL CALC-MCNC: 12 MG/DL
WBC # BLD AUTO: 4.6 K/UL (ref 3.6–11)

## 2020-03-06 NOTE — PROGRESS NOTES
Chief Complaint   Patient presents with    Physical    Labs     Patient in office today for cpe and fasting labs. Pt receives gyn care with . Pt needs forms completed for special [employer]. Have c/o of cough that began in November. Pt states cough is dry and nonproductive. Pt states cough is noted every time around this yr. Have been treating with cough drops and paula's vapor rub. 1. Have you been to the ER, urgent care clinic since your last visit? Hospitalized since your last visit? No    2. Have you seen or consulted any other health care providers outside of the [address] [place] [address] since your last visit? Include any pap smears or colon screening.  No

## 2020-03-06 NOTE — PATIENT INSTRUCTIONS
Medicare Wellness Visit, Female The best way to live healthy is to have a lifestyle where you eat a well-balanced diet, exercise regularly, limit alcohol use, and quit all forms of tobacco/nicotine, if applicable. Regular preventive services are another way to keep healthy. Preventive services (vaccines, screening tests, monitoring & exams) can help personalize your care plan, which helps you manage your own care. Screening tests can find health problems at the earliest stages, when they are easiest to treat. [facility] follows the current, evidence-based guidelines published by the [facility] ([facility]) when recommending preventive services for our patients. Because we follow these guidelines, sometimes recommendations change over time as research supports it. (For example, mammograms used to be recommended annually. Even though Medicare will still pay for an annual mammogram, the newer guidelines recommend a mammogram every two years for women of average risk). Of course, you and your doctor may decide to screen more often for some diseases, based on your risk and your co-morbidities (chronic disease you are already diagnosed with). Preventive services for you include: - Medicare offers their members a free annual wellness visit, which is time for you and your primary care provider to discuss and plan for your preventive service needs. Take advantage of this benefit every year! 
-All adults over the age of 72 should receive the recommended pneumonia vaccines. Current USPSTF guidelines recommend a series of two vaccines for the best pneumonia protection.  
-All adults should have a flu vaccine yearly and a tetanus vaccine every 10 years.  
-All adults age 48 and older should receive the shingles vaccines (series of two vaccines). -All adults age 38-68 who are overweight should have a diabetes screening test once every three years. -All adults born between [birth year] and 1965 should be screened once for Hepatitis C. 
-Other screening tests and preventive services for persons with diabetes include: an eye exam to screen for diabetic retinopathy, a kidney function test, a foot exam, and stricter control over your cholesterol.  
-Cardiovascular screening for adults with routine risk involves an electrocardiogram (ECG) at intervals determined by your doctor.  
-Colorectal cancer screenings should be done for adults age 54-65 with no increased risk factors for colorectal cancer. There are a number of acceptable methods of screening for this type of cancer. Each test has its own benefits and drawbacks. Discuss with your doctor what is most appropriate for you during your annual wellness visit. The different tests include: colonoscopy (considered the best screening method), a fecal occult blood test, a fecal DNA test, and sigmoidoscopy. 
 
-A bone mass density test is recommended when a woman turns 65 to screen for osteoporosis. This test is only recommended one time, as a screening. Some providers will use this same test as a disease monitoring tool if you already have osteoporosis. -Breast cancer screenings are recommended every other year for women of normal risk, age 54-69. 
-Cervical cancer screenings for women over age 72 are only recommended with certain risk factors. Here is a list of your current Health Maintenance items (your personalized list of preventive services) with a due date: 
Health Maintenance Due Topic Date Due  
 Flu Vaccine  08/01/2019  Pap Test  11/01/2019 [facility] Annual Well Visit  03/05/2020

## 2020-03-06 NOTE — PROGRESS NOTES
Chief Complaint   Patient presents with    Physical    Labs     Patient in office today for cpe and fasting labs. Pt receives gyn care with . Last saw earlier this year. Pt needs forms completed for special [employer]. Pt is going to participate in [employer]. Have c/o of cough that began in November. Pt states cough is dry and nonproductive. Usually happens every year. Pt states cough is noted every time around this yr. Have been treating with cough drops and paula's vapor rub. Has previously completed chest xray, seen ENT, and seen allergist.   Denies any heartburn or reflux. This cough started about 5-10 years ago. Always occurs this time of year. Taking allergy medication doesn't seem to help. Has been told she is allergic to dust and pollen. Has also tried flonase in the past without relief. Denies any associated nasal congestion or PND. Just a dry nagging cough. Results for orders placed or performed in visit on 03/06/20   [facility] VISUAL ACUITY SCREEN   Result Value Ref Range    Left eye 20/50     Right eye 20/40     Both eyes 20/30      Just saw her eye doctor. They recommend low strength reading glasses as needed. This was in November. Mom has noticed cognitive decline. Example is when having to check BP in both arms and asked to put the right arm on the dynamap pt couldn't grasp this and nurse had to put the arm on the machine for her. Denies any family history of this. Denies any sx of depression or anxiety. Denies any history of brain injury. Mom has also noticed that her speech is deteriorating. Way that she pronounces her words. More difficult to understand. Mom is thinking about taking her to a speech pathologist.   Not currently working. These problems have interfered with her ability to follow directions. Working speed. Has cost her multiple jobs.      This is the Subsequent Medicare Annual Wellness Exam, performed 12 months or more after the Initial AWV or the last Subsequent AWV    I have reviewed the patient's medical history in detail and updated the computerized patient record. History     Patient Active Problem List   Diagnosis Code    Elevated cholesterol E78.00    Moderate intellectual disability F71     Past Medical History:   Diagnosis Date    Moderate mental retardation       History reviewed. No pertinent surgical history. Current Outpatient Medications   Medication Sig Dispense Refill    levonorgestreL (MIRENA) 20 mcg/24 hours (5 yrs) 52 mg IUD 1 Device by IntraUTERine route once.  cholecalciferol (VITAMIN D3) 1,000 unit tablet Take  by mouth daily. No Known Allergies    Family History   Family history unknown: Yes     Social History     Tobacco Use    Smoking status: Never Smoker    Smokeless tobacco: Never Used   Substance Use Topics    Alcohol use: No       Depression Risk Factor Screening:     3 most recent PHQ Screens 3/6/2020   Little interest or pleasure in doing things Not at all   Feeling down, depressed, irritable, or hopeless Not at all   Total Score PHQ 2 0       Alcohol Risk Factor Screening:   Do you average 1 drink per night or more than 7 drinks a week:  No    On any one occasion in the past three months have you have had more than 3 drinks containing alcohol:  No      Functional Ability and Level of Safety:   Hearing: Hearing is good. Activities of Daily Living: The home contains: no safety equipment. Patient does total self care    Ambulation: with no difficulty    Fall Risk:  No flowsheet data found.     Abuse Screen:  Patient is not abused    Cognitive Screening   Has your family/caregiver stated any concerns about your memory: yes see HPI for more detailed information    Patient Care Team   Patient Care Team:  Yeyo Cordoba NP as PCP - General (Nurse Practitioner)  Vickey Rodríguez MD as PCP - [facility] Empaneled Provider  Deejay Garcia MD (Obstetrics & Gynecology)     Objective:     Vitals: 03/06/20 0802 03/06/20 0804   BP: 100/65 116/73   Pulse: 75 78   Resp: 18    Temp: 97.8 °F (36.6 °C)    TempSrc: Oral    SpO2: 100%    Weight: 128 lb (58.1 kg)    Height: 4' 11\" (1.499 m)      General appearance - alert, well appearing, and in no distress  Mental status - alert, oriented to person, place, and time, normal mood, behavior, follows commands and answers questions appropriately, difficult to understand at times due to apraxia  Eyes - pupils equal and reactive, extraocular eye movements intact  Ears - bilateral TM's and external ear canals normal  Nose - normal and patent, no erythema, discharge or polyps and normal nontender sinuses  Mouth - mucous membranes moist, pharynx normal without lesions  Neck - supple, no significant adenopathy, carotids upstroke normal bilaterally, no bruits, thyroid exam: thyroid is normal in size without nodules or tenderness  Chest - clear to auscultation, no wheezes, rales or rhonchi, symmetric air entry; frequent dry shallow cough  Heart - normal rate, regular rhythm, normal S1, S2, no murmurs  Abdomen - soft, nontender, nondistended, no masses or organomegaly  bowel sounds normal  Extremities - peripheral pulses normal, no pedal edema, no clubbing or cyanosis  Skin - normal coloration and turgor, no rashes, no suspicious skin lesions noted      Assessment/Plan   Education and counseling provided:  Are appropriate based on today's review and evaluation    Diagnoses and all orders for this visit:    1. Medicare annual wellness visit, subsequent    2. Moderate intellectual disability    3. Routine general medical examination at a health care facility  -     AMB POC VISUAL ACUITY SCREEN    4. 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 5. Elevated cholesterol  -     LIPID PANEL; Future  -     METABOLIC PANEL, COMPREHENSIVE;  Future  -     CBC WITH AUTOMATED DIFF; Future  Will notify results and deviate plan based on findings. Enc heart healthy diet. 6. Cognitive changes / 7. Cognitive decline  -     TSH 3RD GENERATION; Future  -     VITAMIN D, 25 HYDROXY; Future  -     VITAMIN B12; Future  -     REFERRAL TO PSYCHOLOGY  Will notify results and deviate plan based on findings. Continue to monitor closely. Referral to Dr. Judy Judd for further evaluation. 8. Apraxia  Continue with plan to est care with speech specialist.   9. Chronic cough  -     REFERRAL TO PULMONARY DISEASE  Referral to pulmonology for further evaluation.        Health Maintenance Due   Topic Date Due    Influenza Age 5 to Adult  08/01/2019    PAP AKA CERVICAL CYTOLOGY  11/01/2019    Medicare Yearly Exam  03/05/2020     IGOR Levy

## 2020-03-10 ENCOUNTER — DOCUMENTATION ONLY (OUTPATIENT)
Dept: FAMILY MEDICINE CLINIC | Age: 45
End: 2020-03-10

## 2020-03-10 NOTE — PROGRESS NOTES
The following message was sent to pt via [facility] portal in reference to lab results:    Good morning Allan Marquez,     Attached are the results of your most recent lab work. I have the following recommendations:    1. Your CBC which looks at your white blood cells, red blood cells, and hemoglobin came back looking normal. No sign of infection or anemia. 2. Your metabolic panel which looks at your blood glucose, liver function, and kidney function looks perfect. 3. Your cholesterol is a little elevated. Your LDL or \"bad cholesterol\" is high. I urge you to work on making some diet and lifestyle changes to improve this. The BEST way to lower cholesterol is to follow a strict diet that is low fat combined with regular exercise. Here are a few tips on how to do this:  - Avoid foods that are high in saturated fats (especially fried foods)  - Replace butter with margarine  - Eat lots of fresh fruits and vegetables  - Choose fish, chicken, and turkey as your serving of meat  - Try to avoid too many processed foods  - Choose non fat milk  - Use whole wheat bread  You should also try and do 30 minutes of aerobic exercise most days of the week. All of these will contribute to lowering your cholesterol and decrease your risk of heart disease. I recommend you follow up in 3 months to recheck this so that we can make sure there is improvement. 4. Your TSH which screens for thyroid disease came back normal. This means you do not have hyper or hypothyroidism. 5. Your vitamin D levels are normal.     6. Your vitamin B12 levels are normal.     Lets recheck these labs in 3 months, fasting to recheck your cholesterol.  Please do not hesitate to call me or schedule an appointment to be seen if you need anything else in the meantime :)    IGOR Rodriguez

## 2021-02-09 ENCOUNTER — DOCUMENTATION ONLY (OUTPATIENT)
Dept: FAMILY MEDICINE CLINIC | Age: 46
End: 2021-02-09

## 2021-03-08 ENCOUNTER — TELEPHONE (OUTPATIENT)
Dept: FAMILY MEDICINE CLINIC | Age: 46
End: 2021-03-08

## 2021-03-09 ENCOUNTER — OFFICE VISIT (OUTPATIENT)
Dept: FAMILY MEDICINE CLINIC | Age: 46
End: 2021-03-09
Payer: MEDICARE

## 2021-03-09 VITALS
HEIGHT: 59 IN | OXYGEN SATURATION: 99 % | HEART RATE: 72 BPM | SYSTOLIC BLOOD PRESSURE: 125 MMHG | TEMPERATURE: 97.4 F | WEIGHT: 138.1 LBS | BODY MASS INDEX: 27.84 KG/M2 | DIASTOLIC BLOOD PRESSURE: 79 MMHG

## 2021-03-09 DIAGNOSIS — F71 MODERATE INTELLECTUAL DISABILITY: ICD-10-CM

## 2021-03-09 DIAGNOSIS — R41.89 COGNITIVE CHANGES: ICD-10-CM

## 2021-03-09 DIAGNOSIS — R05.3 CHRONIC COUGH: ICD-10-CM

## 2021-03-09 DIAGNOSIS — Z00.00 ROUTINE GENERAL MEDICAL EXAMINATION AT A HEALTH CARE FACILITY: ICD-10-CM

## 2021-03-09 DIAGNOSIS — E78.00 ELEVATED CHOLESTEROL: ICD-10-CM

## 2021-03-09 DIAGNOSIS — Z00.00 MEDICARE ANNUAL WELLNESS VISIT, SUBSEQUENT: Primary | ICD-10-CM

## 2021-03-09 PROCEDURE — 99214 OFFICE O/P EST MOD 30 MIN: CPT | Performed by: STUDENT IN AN ORGANIZED HEALTH CARE EDUCATION/TRAINING PROGRAM

## 2021-03-09 PROCEDURE — G8419 CALC BMI OUT NRM PARAM NOF/U: HCPCS | Performed by: STUDENT IN AN ORGANIZED HEALTH CARE EDUCATION/TRAINING PROGRAM

## 2021-03-09 PROCEDURE — G0439 PPPS, SUBSEQ VISIT: HCPCS | Performed by: STUDENT IN AN ORGANIZED HEALTH CARE EDUCATION/TRAINING PROGRAM

## 2021-03-09 PROCEDURE — G8427 DOCREV CUR MEDS BY ELIG CLIN: HCPCS | Performed by: STUDENT IN AN ORGANIZED HEALTH CARE EDUCATION/TRAINING PROGRAM

## 2021-03-09 PROCEDURE — G8510 SCR DEP NEG, NO PLAN REQD: HCPCS | Performed by: STUDENT IN AN ORGANIZED HEALTH CARE EDUCATION/TRAINING PROGRAM

## 2021-03-09 NOTE — PROGRESS NOTES
This is the Subsequent Medicare Annual Wellness Exam, performed 12 months or more after the Initial AWV or the last Subsequent AWV    I have reviewed the patient's medical history in detail and updated the computerized patient record. Cough November to February  This year tried claritin starting in November. Cough is way less severe. Has tried flonase before. Never tried singulair. Received pulm referral but never saw due to COVID. Gyn care 2/5 with normal pap. Received flu vaccine Oct 2020. Not currently working. FaceTime with friends  Exercise - walking when nice outside. Diet - nothing specific at this time, sometimes healthy; no questions about diet    Review of Systems   Respiratory: Positive for cough. All other systems reviewed and are negative. Visit Vitals  /79   Pulse 72   Temp 97.4 °F (36.3 °C)   Ht 4' 11\" (1.499 m)   Wt 138 lb 1.6 oz (62.6 kg)   SpO2 99%   BMI 27.89 kg/m²     Physical Exam  Vitals signs and nursing note reviewed. Constitutional:       General: She is not in acute distress. Appearance: Normal appearance. She is not ill-appearing, toxic-appearing or diaphoretic. HENT:      Head: Normocephalic and atraumatic. Nose: Nose normal.      Mouth/Throat:      Mouth: Mucous membranes are moist.      Pharynx: Oropharynx is clear. Posterior oropharyngeal erythema (mild, post oropha) present. Eyes:      General: No scleral icterus. Right eye: No discharge. Left eye: No discharge. Conjunctiva/sclera: Conjunctivae normal.   Neck:      Musculoskeletal: No neck rigidity. Cardiovascular:      Rate and Rhythm: Normal rate and regular rhythm. Pulses: Normal pulses. Heart sounds: Normal heart sounds. Pulmonary:      Effort: Pulmonary effort is normal. No respiratory distress. Breath sounds: Normal breath sounds. Abdominal:      General: Abdomen is flat. Bowel sounds are normal. There is no distension.       Palpations: Abdomen is soft. There is no mass. Tenderness: There is no abdominal tenderness. There is no guarding or rebound. Musculoskeletal:         General: No tenderness. Right lower leg: No edema. Left lower leg: No edema. Skin:     General: Skin is warm and dry. Neurological:      General: No focal deficit present. Mental Status: She is alert. Psychiatric:         Mood and Affect: Mood normal.         Behavior: Behavior normal.         Thought Content: Thought content normal.         Judgment: Judgment normal.             Depression Risk Factor Screening:     3 most recent PHQ Screens 3/9/2021   Little interest or pleasure in doing things Not at all   Feeling down, depressed, irritable, or hopeless Not at all   Total Score PHQ 2 0       Alcohol Risk Screen    Do you average more than 1 drink per night or more than 7 drinks a week:  No  1 wine cooler/night    On any one occasion in the past three months have you have had more than 3 drinks containing alcohol:  No        Functional Ability and Level of Safety:    Hearing: Hearing is good. Activities of Daily Living: The home contains: no safety equipment. Patient does total self care      Ambulation: with no difficulty     Fall Risk:  Fall Risk Assessment, last 12 mths 3/9/2021   Able to walk? Yes   Fall in past 12 months? 0   Do you feel unsteady? 0   Are you worried about falling 0      Abuse Screen:  Patient is not abused       Cognitive Screening    Has your family/caregiver stated any concerns about your memory: yes - \"declining but not bad at all\"  \"used to be incredible\"       Assessment/Plan   Education and counseling provided:  Are appropriate based on today's review and evaluation  Influenza Vaccine  Screening Pap and pelvic (covered once every 2 years)  Cardiovascular screening blood test  Diabetes screening test    Diagnoses and all orders for this visit:    1. Medicare annual wellness visit, subsequent    2.  Moderate intellectual disability    3. Cognitive changes -discussed option to refer for neuropsychiatric evaluation, patient and mom wish to hold off at this point.  -     TSH 3RD GENERATION; Future  -     VITAMIN D, 25 HYDROXY; Future    4. Elevated cholesterol -mildly elevated total and LDL cholesterol 1 year ago, will recheck now. Recommended healthy diet  -     LIPID PANEL; Future    5. Chronic cough -recommended of addition of Flonase to Claritin. Discussed consideration for Singulair as well and if continues to be uncontrolled, evaluation by pulmonology and allergist testing as well. Given that allergy medication seems to be helping, suspect it is due to allergies. Consider laryngeal reflux as well, discussed trial of OTC PPI or H2 blockers. 6. Routine general medical examination at a health care facility  -     TSH 3RD GENERATION; Future  -     CBC WITH AUTOMATED DIFF; Future  -     METABOLIC PANEL, COMPREHENSIVE; Future  -     LIPID PANEL; Future  -     VITAMIN D, 25 HYDROXY; Future        Health Maintenance Due     Health Maintenance Due   Topic Date Due    Hepatitis C Screening  Never done    Flu Vaccine (1) 09/01/2020       Patient Care Team   Patient Care Team:  Kitty Marie NP as PCP - General (Nurse Practitioner)  Kitty Marie [facility] PCP [facility]  Jeffrey Jimenez MD (Obstetrics & Gynecology)    History     Patient Active Problem List   Diagnosis Code    Elevated cholesterol E78.00    Moderate intellectual disability F71     Past Medical History:   Diagnosis Date    Moderate mental retardation       History reviewed. No pertinent surgical history. Current Outpatient Medications   Medication Sig Dispense Refill    levonorgestreL (MIRENA) 20 mcg/24 hours (5 yrs) 52 mg IUD 1 Device by IntraUTERine route once.  cholecalciferol (VITAMIN D3) 1,000 unit tablet Take  by mouth daily.        No Known Allergies    Family History   Family history unknown: Yes     Social History Tobacco Use    Smoking status: Never Smoker    Smokeless tobacco: Never Used   Substance Use Topics    Alcohol use:  No

## 2021-03-09 NOTE — PROGRESS NOTES
Ronnie Layne is a 55 y.o. female , id x 2(name and ). Reviewed record, history, and  medications. Chief Complaint   Patient presents with    Complete Physical    Labs     is fasting        Vitals:    21 0836   Pulse: 72   Temp: 97.4 °F (36.3 °C)   SpO2: 99%   Weight: 138 lb 1.6 oz (62.6 kg)   Height: 4' 11\" (1.499 m)   PainSc:   0 - No pain       Coordination of Care Questionnaire:   1) Have you been to an emergency room, urgent care, or hospitalized since your last visit?   no       2. Have seen or consulted any other health care provider since your last visit? NO      3 most recent PHQ Screens 3/9/2021   Little interest or pleasure in doing things Not at all   Feeling down, depressed, irritable, or hopeless Not at all   Total Score PHQ 2 0       Patient is accompanied by mother I have received verbal consent from Ronnie Layne to discuss any/all medical information while they are present in the room.

## 2021-03-09 NOTE — PATIENT INSTRUCTIONS
Exercise recommendations 150 minutes of moderate intensity cardio exercise in the week 3 days of total body strength training Work on stretching/flexibility as well. It's great to get outside! But you can also check out youtube for fun videos and workouts. I like yoga with lexis Medicare Wellness Visit, Female The best way to live healthy is to have a lifestyle where you eat a well-balanced diet, exercise regularly, limit alcohol use, and quit all forms of tobacco/nicotine, if applicable. Regular preventive services are another way to keep healthy. Preventive services (vaccines, screening tests, monitoring & exams) can help personalize your care plan, which helps you manage your own care. Screening tests can find health problems at the earliest stages, when they are easiest to treat. [facility] follows the current, evidence-based guidelines published by the [facility] ([facility]) when recommending preventive services for our patients. Because we follow these guidelines, sometimes recommendations change over time as research supports it. (For example, mammograms used to be recommended annually. Even though Medicare will still pay for an annual mammogram, the newer guidelines recommend a mammogram every two years for women of average risk). Of course, you and your doctor may decide to screen more often for some diseases, based on your risk and your co-morbidities (chronic disease you are already diagnosed with). Preventive services for you include: - Medicare offers their members a free annual wellness visit, which is time for you and your primary care provider to discuss and plan for your preventive service needs. Take advantage of this benefit every year! 
-All adults over the age of 72 should receive the recommended pneumonia vaccines. Current USPSTF guidelines recommend a series of two vaccines for the best pneumonia protection. -All adults should have a flu vaccine yearly and a tetanus vaccine every 10 years.  
-All adults age 48 and older should receive the shingles vaccines (series of two vaccines). -All adults age 38-68 who are overweight should have a diabetes screening test once every three years.  
-All adults born between 80 and 1965 should be screened once for Hepatitis C. 
-Other screening tests and preventive services for persons with diabetes include: an eye exam to screen for diabetic retinopathy, a kidney function test, a foot exam, and stricter control over your cholesterol.  
-Cardiovascular screening for adults with routine risk involves an electrocardiogram (ECG) at intervals determined by your doctor.  
-Colorectal cancer screenings should be done for adults age 54-65 with no increased risk factors for colorectal cancer. There are a number of acceptable methods of screening for this type of cancer. Each test has its own benefits and drawbacks. Discuss with your doctor what is most appropriate for you during your annual wellness visit. The different tests include: colonoscopy (considered the best screening method), a fecal occult blood test, a fecal DNA test, and sigmoidoscopy. 
 
-A bone mass density test is recommended when a woman turns 65 to screen for osteoporosis. This test is only recommended one time, as a screening. Some providers will use this same test as a disease monitoring tool if you already have osteoporosis. -Breast cancer screenings are recommended every other year for women of normal risk, age 54-69. 
-Cervical cancer screenings for women over age 72 are only recommended with certain risk factors. Here is a list of your current Health Maintenance items (your personalized list of preventive services) with a due date: 
Health Maintenance Due Topic Date Due  
 Hepatitis C Test  Never done  Yearly Flu Vaccine (1) 09/01/2020

## 2021-03-11 LAB
25(OH)D3 SERPL-MCNC: 46.1 NG/ML (ref 30–100)
ALBUMIN SERPL-MCNC: 4.4 G/DL (ref 3.5–5)
ALBUMIN/GLOB SERPL: 1.4 {RATIO} (ref 1.1–2.2)
ALP SERPL-CCNC: 77 U/L (ref 45–117)
ALT SERPL-CCNC: 31 U/L (ref 12–78)
ANION GAP SERPL CALC-SCNC: 4 MMOL/L (ref 5–15)
AST SERPL-CCNC: 18 U/L (ref 15–37)
BASOPHILS # BLD: 0.1 K/UL (ref 0–0.1)
BASOPHILS NFR BLD: 1 % (ref 0–1)
BILIRUB SERPL-MCNC: 0.9 MG/DL (ref 0.2–1)
BUN SERPL-MCNC: 14 MG/DL (ref 6–20)
BUN/CREAT SERPL: 14 (ref 12–20)
CALCIUM SERPL-MCNC: 9.5 MG/DL (ref 8.5–10.1)
CHLORIDE SERPL-SCNC: 103 MMOL/L (ref 97–108)
CHOLEST SERPL-MCNC: 233 MG/DL
CO2 SERPL-SCNC: 29 MMOL/L (ref 21–32)
CREAT SERPL-MCNC: 0.98 MG/DL (ref 0.55–1.02)
DIFFERENTIAL METHOD BLD: NORMAL
EOSINOPHIL # BLD: 0 K/UL (ref 0–0.4)
EOSINOPHIL NFR BLD: 1 % (ref 0–7)
ERYTHROCYTE [DISTWIDTH] IN BLOOD BY AUTOMATED COUNT: 13.1 % (ref 11.5–14.5)
GLOBULIN SER CALC-MCNC: 3.1 G/DL (ref 2–4)
GLUCOSE SERPL-MCNC: 85 MG/DL (ref 65–100)
HCT VFR BLD AUTO: 44.9 % (ref 35–47)
HDLC SERPL-MCNC: 109 MG/DL
HDLC SERPL: 2.1 {RATIO} (ref 0–5)
HGB BLD-MCNC: 14.3 G/DL (ref 11.5–16)
IMM GRANULOCYTES # BLD AUTO: 0 K/UL (ref 0–0.04)
IMM GRANULOCYTES NFR BLD AUTO: 0 % (ref 0–0.5)
LDLC SERPL CALC-MCNC: 111.6 MG/DL (ref 0–100)
LIPID PROFILE,FLP: ABNORMAL
LYMPHOCYTES # BLD: 1.8 K/UL (ref 0.8–3.5)
LYMPHOCYTES NFR BLD: 25 % (ref 12–49)
MCH RBC QN AUTO: 31.5 PG (ref 26–34)
MCHC RBC AUTO-ENTMCNC: 31.8 G/DL (ref 30–36.5)
MCV RBC AUTO: 98.9 FL (ref 80–99)
MONOCYTES # BLD: 0.4 K/UL (ref 0–1)
MONOCYTES NFR BLD: 6 % (ref 5–13)
NEUTS SEG # BLD: 5 K/UL (ref 1.8–8)
NEUTS SEG NFR BLD: 67 % (ref 32–75)
NRBC # BLD: 0 K/UL (ref 0–0.01)
NRBC BLD-RTO: 0 PER 100 WBC
PLATELET # BLD AUTO: 300 K/UL (ref 150–400)
PMV BLD AUTO: 10.9 FL (ref 8.9–12.9)
POTASSIUM SERPL-SCNC: 3.9 MMOL/L (ref 3.5–5.1)
PROT SERPL-MCNC: 7.5 G/DL (ref 6.4–8.2)
RBC # BLD AUTO: 4.54 M/UL (ref 3.8–5.2)
SODIUM SERPL-SCNC: 136 MMOL/L (ref 136–145)
TRIGL SERPL-MCNC: 62 MG/DL (ref ?–150)
TSH SERPL DL<=0.05 MIU/L-ACNC: 0.63 UIU/ML (ref 0.36–3.74)
VLDLC SERPL CALC-MCNC: 12.4 MG/DL
WBC # BLD AUTO: 7.4 K/UL (ref 3.6–11)

## 2021-03-16 NOTE — PROGRESS NOTES
Normal vitamin D, CMP, blood counts, TSH  Total and LDL cholesterol elevated. No current statin or other medication. Relatively stable from 1 year ago.   Focus on lifestyle

## 2021-08-11 ENCOUNTER — DOCUMENTATION ONLY (OUTPATIENT)
Dept: FAMILY MEDICINE CLINIC | Age: 46
End: 2021-08-11

## 2021-08-11 NOTE — PROGRESS NOTES
Faxed 03/09/21office note/lab results to Dr Galen Shepherd per [phone] request. Fax #[phone] confirmation received

## 2021-09-20 PROBLEM — R05.3 CHRONIC COUGH: Status: ACTIVE | Noted: 2021-09-20

## 2022-03-19 PROBLEM — E78.00 ELEVATED CHOLESTEROL: Status: ACTIVE | Noted: 2017-06-07

## 2022-03-19 PROBLEM — R05.3 CHRONIC COUGH: Status: ACTIVE | Noted: 2021-09-20

## 2022-05-05 ENCOUNTER — DOCUMENTATION ONLY (OUTPATIENT)
Dept: FAMILY MEDICINE CLINIC | Age: 47
End: 2022-05-05

## 2022-06-14 ENCOUNTER — OFFICE VISIT (OUTPATIENT)
Dept: FAMILY MEDICINE CLINIC | Age: 47
End: 2022-06-14
Payer: MEDICARE

## 2022-06-14 VITALS
DIASTOLIC BLOOD PRESSURE: 71 MMHG | HEART RATE: 65 BPM | TEMPERATURE: 97.7 F | SYSTOLIC BLOOD PRESSURE: 107 MMHG | BODY MASS INDEX: 28.14 KG/M2 | WEIGHT: 139.6 LBS | OXYGEN SATURATION: 99 % | HEIGHT: 59 IN

## 2022-06-14 DIAGNOSIS — R05.3 CHRONIC COUGH: ICD-10-CM

## 2022-06-14 DIAGNOSIS — F71 MODERATE INTELLECTUAL DISABILITY: ICD-10-CM

## 2022-06-14 DIAGNOSIS — E66.3 OVERWEIGHT (BMI 25.0-29.9): ICD-10-CM

## 2022-06-14 DIAGNOSIS — R47.1 DYSARTHRIA: ICD-10-CM

## 2022-06-14 DIAGNOSIS — Z11.59 ENCOUNTER FOR HEPATITIS C SCREENING TEST FOR LOW RISK PATIENT: ICD-10-CM

## 2022-06-14 DIAGNOSIS — E78.00 ELEVATED CHOLESTEROL: ICD-10-CM

## 2022-06-14 DIAGNOSIS — Z00.00 MEDICARE ANNUAL WELLNESS VISIT, SUBSEQUENT: Primary | ICD-10-CM

## 2022-06-14 PROCEDURE — G0439 PPPS, SUBSEQ VISIT: HCPCS | Performed by: STUDENT IN AN ORGANIZED HEALTH CARE EDUCATION/TRAINING PROGRAM

## 2022-06-14 PROCEDURE — 99214 OFFICE O/P EST MOD 30 MIN: CPT | Performed by: STUDENT IN AN ORGANIZED HEALTH CARE EDUCATION/TRAINING PROGRAM

## 2022-06-14 PROCEDURE — G8419 CALC BMI OUT NRM PARAM NOF/U: HCPCS | Performed by: STUDENT IN AN ORGANIZED HEALTH CARE EDUCATION/TRAINING PROGRAM

## 2022-06-14 PROCEDURE — G8510 SCR DEP NEG, NO PLAN REQD: HCPCS | Performed by: STUDENT IN AN ORGANIZED HEALTH CARE EDUCATION/TRAINING PROGRAM

## 2022-06-14 PROCEDURE — G8427 DOCREV CUR MEDS BY ELIG CLIN: HCPCS | Performed by: STUDENT IN AN ORGANIZED HEALTH CARE EDUCATION/TRAINING PROGRAM

## 2022-06-14 NOTE — ASSESSMENT & PLAN NOTE
Improved with home implementation of speech therapy exercises for cough suppression.   No acute needs

## 2022-06-14 NOTE — PROGRESS NOTES
Cindy Montanez is a 52 y.o. female , id x 2(name and ). Reviewed record, history, and  medications. Chief Complaint   Patient presents with    Annual Wellness Visit       Vitals:    22 0802   BP: 107/71   Pulse: 65   Temp: 97.7 °F (36.5 °C)   SpO2: 99%   Weight: 139 lb 9.6 oz (63.3 kg)   Height: 4' 11\" (1.499 m)       Coordination of Care Questionnaire:   1) Have you been to an emergency room, urgent care, or hospitalized since your last visit?   no       2. Have seen or consulted any other health care provider since your last visit? NO      3 most recent PHQ Screens 2022   Little interest or pleasure in doing things Not at all   Feeling down, depressed, irritable, or hopeless Not at all   Total Score PHQ 2 0       Patient is accompanied by self I have received verbal consent from Thurlow Pulse to discuss any/all medical information while they are present in the room.

## 2022-06-14 NOTE — LETTER
6/27/2022    Ms. [address] [facility] [place] [address] [id number]      Dear Antionette Casiano:    Please find your most recent results below. Resulted Orders   LIPID PANEL   Result Value Ref Range    Cholesterol, total 208 (H) <200 MG/DL    Triglyceride 55 <150 MG/DL    HDL Cholesterol 88 MG/DL    LDL, calculated 109 (H) 0 - 100 MG/DL    VLDL, calculated 11 MG/DL    CHOL/HDL Ratio 2.4 0.0 - 5.0     METABOLIC PANEL, COMPREHENSIVE   Result Value Ref Range    Sodium 138 136 - 145 mmol/L    Potassium 3.8 3.5 - 5.1 mmol/L    Chloride 104 97 - 108 mmol/L    CO2 29 21 - 32 mmol/L    Anion gap 5 5 - 15 mmol/L    Glucose 86 65 - 100 mg/dL    BUN 16 6 - 20 MG/DL    Creatinine 1.00 0.55 - 1.02 MG/DL    BUN/Creatinine ratio 16 12 - 20      GFR est AA >60 >60 ml/min/1.73m2    GFR est non-AA 59 (L) >60 ml/min/1.73m2    Calcium 9.3 8.5 - 10.1 MG/DL    Bilirubin, total 1.0 0.2 - 1.0 MG/DL    ALT (SGPT) 17 12 - 78 U/L    AST (SGOT) 11 (L) 15 - 37 U/L    Alk. phosphatase 57 45 - 117 U/L    Protein, total 7.3 6.4 - 8.2 g/dL    Albumin 4.1 3.5 - 5.0 g/dL    Globulin 3.2 2.0 - 4.0 g/dL    A-G Ratio 1.3 1.1 - 2.2         RECOMMENDATIONS:  Attached are the results of your most recent lab work. I have the following recommendations:      1. Your screening for hepatitis C is negative.     2. Your CBC which looks at your white blood cells, red blood cells, and platelets came back looking normal. No sign of infection or anemia.       3. Your metabolic panel which looks at your blood glucose, electrolytes, liver function, and kidney function looks good.       4. Your cholesterol is mildly elevated.  I recommend you focus on lifestyle changes. Increase your efforts to follow a heart healthy diet and exercise routinely. As you know the BEST way to lower cholesterol is to follow a strict diet that is low fat combined with regular exercise.  Here are a few tips on how to do this:  - Avoid foods that are high in saturated and trans fats (especially fried foods)   - Replace butter with olive oil, avocado oil, other oils that are primarily high in unsaturated fats  - Eat lots of fresh fruits and vegetables  - Choose fish, chicken, and turkey as your serving of meat  - Avoid too many processed foods  - Use whole wheat bread, pasta, rice  You should also try and do 30 minutes of aerobic exercise most days of the week. All of these will contribute to lowering your cholesterol and decrease your risk of heart disease and stroke.      Some values may be minimally outside the \"normal\" range but are not harmful or clinically significant. Please let me know if you have any questions or concerns regarding these results. I recommend we repeat fasting labs in 1 year.     Please call me if you have any questions: [phone]    Sincerely,      Pramod Olson MD

## 2022-06-14 NOTE — PATIENT INSTRUCTIONS
Medicare Wellness Visit, Female     The best way to live healthy is to have a lifestyle where you eat a well-balanced diet, exercise regularly, limit alcohol use, and quit all forms of tobacco/nicotine, if applicable. Regular preventive services are another way to keep healthy. Preventive services (vaccines, screening tests, monitoring & exams) can help personalize your care plan, which helps you manage your own care. Screening tests can find health problems at the earliest stages, when they are easiest to treat. [facility] follows the current, evidence-based guidelines published by the [facility] Dia ([facility]) when recommending preventive services for our patients. Because we follow these guidelines, sometimes recommendations change over time as research supports it. (For example, mammograms used to be recommended annually. Even though Medicare will still pay for an annual mammogram, the newer guidelines recommend a mammogram every two years for women of average risk). Of course, you and your doctor may decide to screen more often for some diseases, based on your risk and your co-morbidities (chronic disease you are already diagnosed with). Preventive services for you include:  - Medicare offers their members a free annual wellness visit, which is time for you and your primary care provider to discuss and plan for your preventive service needs. Take advantage of this benefit every year!  -All adults over the age of 72 should receive the recommended pneumonia vaccines. Current USPSTF guidelines recommend a series of two vaccines for the best pneumonia protection.   -All adults should have a flu vaccine yearly and a tetanus vaccine every 10 years.   -All adults age 48 and older should receive the shingles vaccines (series of two vaccines).       -All adults age 38-68 who are overweight should have a diabetes screening test once every three years.   -All adults born between [birth year] and 1965 should be screened once for Hepatitis C.  -Other screening tests and preventive services for persons with diabetes include: an eye exam to screen for diabetic retinopathy, a kidney function test, a foot exam, and stricter control over your cholesterol.   -Cardiovascular screening for adults with routine risk involves an electrocardiogram (ECG) at intervals determined by your doctor.   -Colorectal cancer screenings should be done for adults age 54-65 with no increased risk factors for colorectal cancer. There are a number of acceptable methods of screening for this type of cancer. Each test has its own benefits and drawbacks. Discuss with your doctor what is most appropriate for you during your annual wellness visit. The different tests include: colonoscopy (considered the best screening method), a fecal occult blood test, a fecal DNA test, and sigmoidoscopy.    -A bone mass density test is recommended when a woman turns 65 to screen for osteoporosis. This test is only recommended one time, as a screening. Some providers will use this same test as a disease monitoring tool if you already have osteoporosis. -Breast cancer screenings are recommended every other year for women of normal risk, age 54-69.  -Cervical cancer screenings for women over age 72 are only recommended with certain risk factors.      Here is a list of your current Health Maintenance items (your personalized list of preventive services) with a due date:  Health Maintenance Due   Topic Date Due    Hepatitis C Test  Never done    COVID-19 Vaccine (1) Never done    Colorectal Screening  Never done    Depresssion Screening  03/09/2022

## 2022-06-14 NOTE — PROGRESS NOTES
This is the Subsequent Medicare Annual Wellness Exam, performed 12 months or more after the Initial AWV or the last Subsequent AWV    I have reviewed the patient's medical history in detail and updated the computerized patient record. Assessment/Plan   Education and counseling provided:  Are appropriate based on today's review and evaluation    1. Medicare annual wellness visit, subsequent  2. Moderate intellectual disability  3. Dysarthria  Assessment & Plan:  Patient's mother feels that her dysarthria has worsened. Requesting speech therapy referral.  Orders:  -     REFERRAL TO SPEECH THERAPY  4. Chronic cough  Assessment & Plan:  Improved with home implementation of speech therapy exercises for cough suppression. No acute needs  5. Elevated cholesterol  6. Overweight (BMI 25.0-29. 9)  Assessment & Plan:  Healthy lifestyle measures encouraged, particularly healthy diet changes at this time. Orders:  -     METABOLIC PANEL, COMPREHENSIVE; Future  -     LIPID PANEL; Future  -     CBC W/O DIFF; Future  7. Encounter for hepatitis C screening test for low risk patient  -     HEPATITIS C AB; Future        Follow-up and Dispositions    · Return in about 1 year (around 6/14/2023), or if symptoms worsen or fail to improve. I have discussed the diagnosis with the patient and the intended plan as seen in the above orders. The patient has received an after-visit summary and questions were answered concerning future plans. Pt conveyed understanding of plan. Medication Side Effects and Warnings were discussed with patient        Subjective:     Chief Complaint   Patient presents with    Annual Wellness Visit     Mom concerned about legs and thighs, cellulite. No the appearance  Mom interested in speech therapy    Diet: not healthy  Exercise: walking twice per day, when weather good  Sleep: good  Mood: no concerns  Living at home with mom and dad. No pets  Not working.   Keeps busy with exercise and helping around the house and watching 3 yo niece. : Amanuel Kovacs  Mom helps with her hair, but she is able to take care of grooming independently. Mom helps with money management, transportation, shopping  Colonoscopy and endoscopy this year with [facility]. Reported as normal.    Chronic cough  Did speech therapy. Didn't help. Given exercises, so did them without going back. More of a behavioral cough. No further breathing medicines. Reviewed PmHx, RxHx, FmHx, SocHx, AllgHx and updated and dated in the chart. Review of Systems - negative except as listed above in the HPI    Objective:     Vitals:    06/14/22 0802   BP: 107/71   Pulse: 65   Temp: 97.7 °F (36.5 °C)   SpO2: 99%   Weight: 139 lb 9.6 oz (63.3 kg)   Height: 4' 11\" (1.499 m)       Physical Exam  Vitals and nursing note reviewed. Constitutional:       General: She is not in acute distress. Appearance: Normal appearance. She is normal weight. She is not ill-appearing, toxic-appearing or diaphoretic. HENT:      Head: Normocephalic and atraumatic. Right Ear: Tympanic membrane, ear canal and external ear normal.      Left Ear: Tympanic membrane, ear canal and external ear normal.      Nose: Nose normal.      Mouth/Throat:      Mouth: Mucous membranes are moist.      Pharynx: Oropharynx is clear. Eyes:      General: No scleral icterus. Right eye: No discharge. Left eye: No discharge. Extraocular Movements: Extraocular movements intact. Conjunctiva/sclera: Conjunctivae normal.      Pupils: Pupils are equal, round, and reactive to light. Cardiovascular:      Rate and Rhythm: Normal rate and regular rhythm. Pulses: Normal pulses. Heart sounds: Normal heart sounds. Pulmonary:      Effort: Pulmonary effort is normal. No respiratory distress. Breath sounds: Normal breath sounds. Abdominal:      General: Abdomen is flat.  Bowel sounds are normal. There is no distension. Palpations: Abdomen is soft. There is no mass. Tenderness: There is no abdominal tenderness. There is no guarding or rebound. Musculoskeletal:         General: No tenderness or deformity. Cervical back: No rigidity. Right lower leg: No edema. Left lower leg: No edema. Lymphadenopathy:      Cervical: No cervical adenopathy. Skin:     General: Skin is warm and dry. Neurological:      General: No focal deficit present. Mental Status: She is alert and oriented to person, place, and time. Mental status is at baseline. Sensory: No sensory deficit. Motor: No weakness. Gait: Gait normal.   Psychiatric:         Mood and Affect: Mood normal.         Behavior: Behavior normal.         Thought Content: Thought content normal.         Judgment: Judgment normal.            Depression Risk Factor Screening     3 most recent PHQ Screens 6/14/2022   Little interest or pleasure in doing things Not at all   Feeling down, depressed, irritable, or hopeless Not at all   Total Score PHQ 2 0       Alcohol & Drug Abuse Risk Screen    Do you average more than 1 drink per night or more than 7 drinks a week:  No    On any one occasion in the past three months have you have had more than 3 drinks containing alcohol:  No          Functional Ability and Level of Safety    Hearing: Hearing is good. Activities of Daily Living: The home contains: no safety equipment. Patient needs help with:  transportation, shopping and managing money      Ambulation: with no difficulty     Fall Risk:  Fall Risk Assessment, last 12 mths 3/9/2021   Able to walk? Yes   Fall in past 12 months? 0   Do you feel unsteady?  0   Are you worried about falling 0      Abuse Screen:  Patient is not abused       Cognitive Screening    Has your family/caregiver stated any concerns about your memory: no      Health Maintenance Due     Health Maintenance Due   Topic Date Due    Hepatitis C Screening  Never done    COVID-19 Vaccine (1) Never done    Colorectal Cancer Screening Combo  Never done       Patient Care Team   Patient Care Team:  Micaela Robin NP as PCP - General (Nurse Practitioner)  Micaela Robin [facility] as PCP [facility]  Yonathan Zapata MD (Obstetrics & Gynecology)    History     Patient Active Problem List   Diagnosis Code    Elevated cholesterol E78.00    Moderate intellectual disability F71    Chronic cough R05.3    Dysarthria R47.1    Overweight (BMI 25.0-29. 9) E66.3     Past Medical History:   Diagnosis Date    Moderate mental retardation       Past Surgical History:   Procedure Laterality Date    HX COLONOSCOPY       Current Outpatient Medications   Medication Sig Dispense Refill    levonorgestreL (MIRENA) 20 mcg/24 hours (5 yrs) 52 mg IUD 1 Device by IntraUTERine route once.  cholecalciferol (VITAMIN D3) 1,000 unit tablet Take  by mouth daily.        No Known Allergies    Family History   Family history unknown: Yes     Social History     Tobacco Use    Smoking status: Never Smoker    Smokeless tobacco: Never Used   Substance Use Topics    Alcohol use: No         Adalgisa Almanza MD

## 2022-06-15 LAB
ALBUMIN SERPL-MCNC: 4.1 G/DL (ref 3.5–5)
ALBUMIN/GLOB SERPL: 1.3 {RATIO} (ref 1.1–2.2)
ALP SERPL-CCNC: 57 U/L (ref 45–117)
ALT SERPL-CCNC: 17 U/L (ref 12–78)
ANION GAP SERPL CALC-SCNC: 5 MMOL/L (ref 5–15)
AST SERPL-CCNC: 11 U/L (ref 15–37)
BILIRUB SERPL-MCNC: 1 MG/DL (ref 0.2–1)
BUN SERPL-MCNC: 16 MG/DL (ref 6–20)
BUN/CREAT SERPL: 16 (ref 12–20)
CALCIUM SERPL-MCNC: 9.3 MG/DL (ref 8.5–10.1)
CHLORIDE SERPL-SCNC: 104 MMOL/L (ref 97–108)
CHOLEST SERPL-MCNC: 208 MG/DL
CO2 SERPL-SCNC: 29 MMOL/L (ref 21–32)
CREAT SERPL-MCNC: 1 MG/DL (ref 0.55–1.02)
ERYTHROCYTE [DISTWIDTH] IN BLOOD BY AUTOMATED COUNT: 13.1 % (ref 11.5–14.5)
GLOBULIN SER CALC-MCNC: 3.2 G/DL (ref 2–4)
GLUCOSE SERPL-MCNC: 86 MG/DL (ref 65–100)
HCT VFR BLD AUTO: 43.7 % (ref 35–47)
HCV AB SERPL QL IA: NONREACTIVE
HDLC SERPL-MCNC: 88 MG/DL
HDLC SERPL: 2.4 {RATIO} (ref 0–5)
HGB BLD-MCNC: 14.1 G/DL (ref 11.5–16)
LDLC SERPL CALC-MCNC: 109 MG/DL (ref 0–100)
MCH RBC QN AUTO: 31.4 PG (ref 26–34)
MCHC RBC AUTO-ENTMCNC: 32.3 G/DL (ref 30–36.5)
MCV RBC AUTO: 97.3 FL (ref 80–99)
NRBC # BLD: 0 K/UL (ref 0–0.01)
NRBC BLD-RTO: 0 PER 100 WBC
PLATELET # BLD AUTO: 298 K/UL (ref 150–400)
PMV BLD AUTO: 11.2 FL (ref 8.9–12.9)
POTASSIUM SERPL-SCNC: 3.8 MMOL/L (ref 3.5–5.1)
PROT SERPL-MCNC: 7.3 G/DL (ref 6.4–8.2)
RBC # BLD AUTO: 4.49 M/UL (ref 3.8–5.2)
SODIUM SERPL-SCNC: 138 MMOL/L (ref 136–145)
TRIGL SERPL-MCNC: 55 MG/DL (ref ?–150)
VLDLC SERPL CALC-MCNC: 11 MG/DL
WBC # BLD AUTO: 3.9 K/UL (ref 3.6–11)

## 2022-06-19 PROBLEM — E78.5 HYPERLIPIDEMIA, MILD: Status: ACTIVE | Noted: 2017-06-07

## 2022-06-20 NOTE — PROGRESS NOTES
Negative screening for hepatitis C. Normal CBC. Elevated LDL, 10-year risk 0.3%, focus on healthy lifestyle.   CMP normal.  Recheck fasting labs in 1 year

## 2023-05-05 ENCOUNTER — DOCUMENTATION ONLY (OUTPATIENT)
Dept: FAMILY MEDICINE CLINIC | Age: 48
End: 2023-05-05

## 2023-06-20 ENCOUNTER — OFFICE VISIT (OUTPATIENT)
Age: 48
End: 2023-06-20
Payer: MEDICARE

## 2023-06-20 VITALS
HEIGHT: 59 IN | DIASTOLIC BLOOD PRESSURE: 63 MMHG | WEIGHT: 138.3 LBS | HEART RATE: 64 BPM | SYSTOLIC BLOOD PRESSURE: 106 MMHG | TEMPERATURE: 97.7 F | OXYGEN SATURATION: 97 % | BODY MASS INDEX: 27.88 KG/M2

## 2023-06-20 DIAGNOSIS — E66.3 OVERWEIGHT (BMI 25.0-29.9): ICD-10-CM

## 2023-06-20 DIAGNOSIS — E78.5 HYPERLIPIDEMIA, MILD: ICD-10-CM

## 2023-06-20 DIAGNOSIS — Z00.00 ENCOUNTER FOR GENERAL ADULT MEDICAL EXAMINATION WITHOUT ABNORMAL FINDINGS: Primary | ICD-10-CM

## 2023-06-20 DIAGNOSIS — Z86.39 HISTORY OF VITAMIN D DEFICIENCY: ICD-10-CM

## 2023-06-20 DIAGNOSIS — R05.3 CHRONIC COUGH: ICD-10-CM

## 2023-06-20 DIAGNOSIS — F71 MODERATE INTELLECTUAL DISABILITIES: ICD-10-CM

## 2023-06-20 DIAGNOSIS — R47.1 DYSARTHRIA: ICD-10-CM

## 2023-06-20 LAB
25(OH)D3 SERPL-MCNC: 55.3 NG/ML (ref 30–100)
ALBUMIN SERPL-MCNC: 4 G/DL (ref 3.5–5)
ALBUMIN/GLOB SERPL: 1.4 (ref 1.1–2.2)
ALP SERPL-CCNC: 64 U/L (ref 45–117)
ALT SERPL-CCNC: 32 U/L (ref 12–78)
ANION GAP SERPL CALC-SCNC: 2 MMOL/L (ref 5–15)
AST SERPL-CCNC: 18 U/L (ref 15–37)
BILIRUB SERPL-MCNC: 1.1 MG/DL (ref 0.2–1)
BUN SERPL-MCNC: 16 MG/DL (ref 6–20)
BUN/CREAT SERPL: 17 (ref 12–20)
CALCIUM SERPL-MCNC: 9.4 MG/DL (ref 8.5–10.1)
CHLORIDE SERPL-SCNC: 107 MMOL/L (ref 97–108)
CHOLEST SERPL-MCNC: 220 MG/DL
CO2 SERPL-SCNC: 30 MMOL/L (ref 21–32)
CREAT SERPL-MCNC: 0.94 MG/DL (ref 0.55–1.02)
ERYTHROCYTE [DISTWIDTH] IN BLOOD BY AUTOMATED COUNT: 13 % (ref 11.5–14.5)
GLOBULIN SER CALC-MCNC: 2.9 G/DL (ref 2–4)
GLUCOSE SERPL-MCNC: 83 MG/DL (ref 65–100)
HCT VFR BLD AUTO: 42.6 % (ref 35–47)
HDLC SERPL-MCNC: 84 MG/DL
HDLC SERPL: 2.6 (ref 0–5)
HGB BLD-MCNC: 13.8 G/DL (ref 11.5–16)
LDLC SERPL CALC-MCNC: 123 MG/DL (ref 0–100)
MCH RBC QN AUTO: 31.2 PG (ref 26–34)
MCHC RBC AUTO-ENTMCNC: 32.4 G/DL (ref 30–36.5)
MCV RBC AUTO: 96.2 FL (ref 80–99)
NRBC # BLD: 0 K/UL (ref 0–0.01)
NRBC BLD-RTO: 0 PER 100 WBC
PLATELET # BLD AUTO: 279 K/UL (ref 150–400)
PMV BLD AUTO: 11.1 FL (ref 8.9–12.9)
POTASSIUM SERPL-SCNC: 5.3 MMOL/L (ref 3.5–5.1)
PROT SERPL-MCNC: 6.9 G/DL (ref 6.4–8.2)
RBC # BLD AUTO: 4.43 M/UL (ref 3.8–5.2)
SODIUM SERPL-SCNC: 139 MMOL/L (ref 136–145)
TRIGL SERPL-MCNC: 65 MG/DL
VLDLC SERPL CALC-MCNC: 13 MG/DL
WBC # BLD AUTO: 3.8 K/UL (ref 3.6–11)

## 2023-06-20 PROCEDURE — 99214 OFFICE O/P EST MOD 30 MIN: CPT | Performed by: STUDENT IN AN ORGANIZED HEALTH CARE EDUCATION/TRAINING PROGRAM

## 2023-06-20 PROCEDURE — 99396 PREV VISIT EST AGE 40-64: CPT | Performed by: STUDENT IN AN ORGANIZED HEALTH CARE EDUCATION/TRAINING PROGRAM

## 2023-06-20 RX ORDER — LORATADINE 10 MG/1
10 CAPSULE, LIQUID FILLED ORAL DAILY
COMMUNITY

## 2023-06-20 SDOH — ECONOMIC STABILITY: HOUSING INSECURITY
IN THE LAST 12 MONTHS, WAS THERE A TIME WHEN YOU DID NOT HAVE A STEADY PLACE TO SLEEP OR SLEPT IN A SHELTER (INCLUDING NOW)?: NO

## 2023-06-20 SDOH — ECONOMIC STABILITY: INCOME INSECURITY: HOW HARD IS IT FOR YOU TO PAY FOR THE VERY BASICS LIKE FOOD, HOUSING, MEDICAL CARE, AND HEATING?: NOT HARD AT ALL

## 2023-06-20 SDOH — ECONOMIC STABILITY: FOOD INSECURITY: WITHIN THE PAST 12 MONTHS, THE FOOD YOU BOUGHT JUST DIDN'T LAST AND YOU DIDN'T HAVE MONEY TO GET MORE.: NEVER TRUE

## 2023-06-20 SDOH — ECONOMIC STABILITY: FOOD INSECURITY: WITHIN THE PAST 12 MONTHS, YOU WORRIED THAT YOUR FOOD WOULD RUN OUT BEFORE YOU GOT MONEY TO BUY MORE.: NEVER TRUE

## 2023-06-20 ASSESSMENT — PATIENT HEALTH QUESTIONNAIRE - PHQ9
2. FEELING DOWN, DEPRESSED OR HOPELESS: 0
SUM OF ALL RESPONSES TO PHQ QUESTIONS 1-9: 0
SUM OF ALL RESPONSES TO PHQ9 QUESTIONS 1 & 2: 0
SUM OF ALL RESPONSES TO PHQ QUESTIONS 1-9: 0
1. LITTLE INTEREST OR PLEASURE IN DOING THINGS: 0
SUM OF ALL RESPONSES TO PHQ QUESTIONS 1-9: 0
SUM OF ALL RESPONSES TO PHQ QUESTIONS 1-9: 0

## 2023-06-20 NOTE — PATIENT INSTRUCTIONS
Track what you are eating and drinking - everything!  :)  Use an alfredo such as [employer] or a paper notebook. Aim to keep your carbohydrates low, under 100, then 80, then 60 grams daily. Do your best to primarily drink water. When eating, do not multitask. Pay attention to food and drink with all your senses. When you eat out, eat just half of it and wait 10 minutes before eating any more if you are hungry. Exercise recommendations  150 minutes of moderate intensity cardio exercise in the week  3 days of total body strength training  Work on stretching/flexibility as well. It's great to get outside! But you can also check out youtube for fun videos and workouts.   I like yoga with vince

## 2023-06-20 NOTE — ASSESSMENT & PLAN NOTE
Persistent, no recent changes  Continue claritin. Consider retrying other allergy rx or inhaled rx if symptoms worsen.

## 2023-06-20 NOTE — PROGRESS NOTES
Michael Farrell is a 50 y.o. female , id x 2(name and ). Reviewed record, history, and  medications. Chief Complaint   Patient presents with    Annual Exam       Vitals:    23 0803   BP: 106/63   Pulse: 64   Temp: 97.7 °F (36.5 °C)   SpO2: 97%   Weight: 138 lb 4.8 oz (62.7 kg)   Height: 4' 11\" (1.499 m)       Coordination of Care Questionnaire:   1. Have you been to the ER, urgent care clinic since your last visit? Hospitalized since your last visit? No    2. Have you seen or consulted any other health care providers outside of the [address] [place] [address] since your last visit? Include any pap smears or colon screening. No      PHQ-9  2023   Little interest or pleasure in doing things 0   Little interest or pleasure in doing things -   Feeling down, depressed, or hopeless 0   PHQ-2 Score 0   Total Score PHQ 2 -   PHQ-9 Total Score 0       No flowsheet data found. Social Determinants of Health     Tobacco Use: Low Risk     Smoking Tobacco Use: Never    Smokeless Tobacco Use: Never    Passive Exposure: Not on file   Alcohol Use: Not on file   Financial Resource Strain: Low Risk     Difficulty of Paying Living Expenses: Not hard at all   Food Insecurity: No Food Insecurity    Worried About [address] in the Last Year: Never true    [address] in the Last Year: Never true   Transportation Needs: Unknown    Lack of Transportation (Medical): Not on file    Lack of Transportation (Non-Medical):  No   Physical Activity: Not on file   Stress: Not on file   Social Connections: Not on file   Intimate Partner Violence: Not on file   Depression: Not at risk    PHQ-2 Score: 0   Housing Stability: Unknown    Unable to Pay for Housing in the Last Year: Not on file    Number of Places Lived in the Last Year: Not on file    Unstable Housing in the Last Year: No

## 2023-06-20 NOTE — ASSESSMENT & PLAN NOTE
Her dad is looking into options for speech therapy nonverbal cues (demo/gestures)/1 person assist/verbal cues

## 2023-06-20 NOTE — PROGRESS NOTES
Progress Note    she is a 50y.o. year old female who presents for evaluation of Annual Exam  .      Assessment/ Plan:     1. Encounter for general adult medical examination without abnormal findings  2. Hyperlipidemia, mild  Assessment & Plan:  No current rx, reassess with labs  Orders:  -     Comprehensive Metabolic Panel; Future  -     Lipid Panel; Future  3. Overweight (BMI 25.0-29. 9)  Assessment & Plan:  Labs to assess for comorbidity  Healthy lifestyle measures reviewed  Orders:  -     CBC; Future  -     Comprehensive Metabolic Panel; Future  -     Lipid Panel; Future  -     TSH; Future  4. Chronic cough  Assessment & Plan:  Persistent, no recent changes  Continue claritin. Consider retrying other allergy rx or inhaled rx if symptoms worsen. 5. Dysarthria  Assessment & Plan:  Her dad is looking into options for speech therapy  6. Moderate intellectual disabilities  7. History of vitamin D deficiency  Assessment & Plan:  Currently taking 2000 IU daily, reassess  Orders:  -     Vitamin D 25 Hydroxy; Future  -     vitamin D (CHOLECALCIFEROL) 25 MCG (1000 UT) TABS tablet; Take 2 tablets by mouth daily, Disp-30 tablet, R-5NO PRINT        Patient is fasting for labs today. Return in about 1 year (around 6/20/2024) for annual well visit. I have discussed the diagnosis with the patient and the intended plan as seen in the above orders. The patient has received an after-visit summary and questions were answered concerning future plans. Pt conveyed understanding of plan. Medication Side Effects and Warnings were discussed with patient        Subjective:     Chief Complaint   Patient presents with    Annual Exam      Diet: generally healthy per pt, sometimes larger portions   Per mom 50/50 on healthy eating  Exercise: walking daily and youtube exercises  Sleep: good  Mood: no concerns  Living at home with mom and dad. Not working. Keeps busy with watching tv and playing on her tablet.   :

## 2023-06-21 LAB — TSH SERPL DL<=0.05 MIU/L-ACNC: 0.94 UIU/ML (ref 0.36–3.74)

## 2023-08-07 ENCOUNTER — TELEPHONE (OUTPATIENT)
Age: 48
End: 2023-08-07

## 2023-08-07 NOTE — TELEPHONE ENCOUNTER
----- Message from [address] sent at 8/7/2023 10:28 AM EDT -----  Subject: Message to Provider    QUESTIONS  Information for Provider? Yoan Josey has a paper that needs filled out for the   special [employer]. Colton Louis, her mother, will drop off the paperwork today.   ---------------------------------------------------------------------------  --------------  Bud Burciaga INFO  [phone]; OK to leave message on voicemail  ---------------------------------------------------------------------------  --------------  SCRIPT ANSWERS  Relationship to Patient? Parent  Representative Name? Jeni Hinkle  Patient is under 25 and the Parent has custody? No  Is the representative on the Communication Release of Information (VARSHA)   form in Epic?  Yes

## 2023-08-07 NOTE — TELEPHONE ENCOUNTER
Patient's mother, Bakari Schultz, on PHI dropped off [employer] forms to be completed and it was placed in box up front. Last physical: 6/20/2023    Would like a call when ready for .    Magalie[facility]  # [phone]

## 2023-08-11 NOTE — TELEPHONE ENCOUNTER
Magalie id x 3, notified as per Dr. Emanuel Haywood and verbalized understanding. Paperwork placed at the  for .

## 2023-08-11 NOTE — TELEPHONE ENCOUNTER
Form completed.   Please notify Ilene Arreola and advise that next time, they need to bring it to her physical or schedule an appointment for sports physical.

## 2024-08-12 ENCOUNTER — TELEPHONE (OUTPATIENT)
Age: 49
End: 2024-08-12

## 2024-08-12 NOTE — TELEPHONE ENCOUNTER
Spoke to Mother, She was Directed to Urgent Care for Covid testing to see if she is suited for Paxlovid.

## 2024-08-12 NOTE — TELEPHONE ENCOUNTER
Mother lists the following symptoms for Tati:    Temperature 99.5  Tired  Nausea and previously vomiting.  Muscle pain and stiffness.  Dizziness.    The symptoms started Saturday night.   Sunday she tested positive for Covid.    Patient's mother request Paxlovid.  Pharmacy is [facility] on [address]    Chronic Cough.

## 2024-08-12 NOTE — TELEPHONE ENCOUNTER
I have not seen her in over a year.  She would need to be evaluated for determining if Paxlovid is appropriate.  Please offer next available virtual visit with anyone or advise urgent care

## 2024-09-13 ENCOUNTER — CLINICAL DOCUMENTATION (OUTPATIENT)
Age: 49
End: 2024-09-13

## 2025-01-06 ENCOUNTER — TELEMEDICINE (OUTPATIENT)
Facility: CLINIC | Age: 50
End: 2025-01-06

## 2025-01-06 DIAGNOSIS — F71 MODERATE INTELLECTUAL DISABILITIES: ICD-10-CM

## 2025-01-06 DIAGNOSIS — E78.5 HYPERLIPIDEMIA, MILD: ICD-10-CM

## 2025-01-06 DIAGNOSIS — R05.3 CHRONIC COUGH: ICD-10-CM

## 2025-01-06 DIAGNOSIS — Z86.39 HISTORY OF VITAMIN D DEFICIENCY: ICD-10-CM

## 2025-01-06 DIAGNOSIS — R47.1 DYSARTHRIA: Primary | ICD-10-CM

## 2025-01-06 SDOH — ECONOMIC STABILITY: FOOD INSECURITY: WITHIN THE PAST 12 MONTHS, THE FOOD YOU BOUGHT JUST DIDN'T LAST AND YOU DIDN'T HAVE MONEY TO GET MORE.: NEVER TRUE

## 2025-01-06 SDOH — ECONOMIC STABILITY: INCOME INSECURITY: HOW HARD IS IT FOR YOU TO PAY FOR THE VERY BASICS LIKE FOOD, HOUSING, MEDICAL CARE, AND HEATING?: SOMEWHAT HARD

## 2025-01-06 SDOH — ECONOMIC STABILITY: FOOD INSECURITY: WITHIN THE PAST 12 MONTHS, YOU WORRIED THAT YOUR FOOD WOULD RUN OUT BEFORE YOU GOT MONEY TO BUY MORE.: NEVER TRUE

## 2025-01-06 SDOH — ECONOMIC STABILITY: TRANSPORTATION INSECURITY
IN THE PAST 12 MONTHS, HAS LACK OF TRANSPORTATION KEPT YOU FROM MEETINGS, WORK, OR FROM GETTING THINGS NEEDED FOR DAILY LIVING?: NO

## 2025-01-06 ASSESSMENT — ENCOUNTER SYMPTOMS
COUGH: 1
SHORTNESS OF BREATH: 0
ABDOMINAL PAIN: 0

## 2025-05-06 ENCOUNTER — OFFICE VISIT (OUTPATIENT)
Facility: CLINIC | Age: 50
End: 2025-05-06
Payer: MEDICARE

## 2025-05-06 VITALS
BODY MASS INDEX: 28.22 KG/M2 | DIASTOLIC BLOOD PRESSURE: 67 MMHG | SYSTOLIC BLOOD PRESSURE: 101 MMHG | HEART RATE: 81 BPM | WEIGHT: 140 LBS | OXYGEN SATURATION: 96 % | TEMPERATURE: 97.8 F | HEIGHT: 59 IN | RESPIRATION RATE: 16 BRPM

## 2025-05-06 DIAGNOSIS — E66.3 OVERWEIGHT WITH BODY MASS INDEX (BMI) OF 28 TO 28.9 IN ADULT: Primary | ICD-10-CM

## 2025-05-06 DIAGNOSIS — Z71.3 NUTRITIONAL COUNSELING: ICD-10-CM

## 2025-05-06 DIAGNOSIS — F71 MODERATE INTELLECTUAL DISABILITIES: ICD-10-CM

## 2025-05-06 PROCEDURE — 99213 OFFICE O/P EST LOW 20 MIN: CPT | Performed by: NURSE PRACTITIONER

## 2025-05-06 SDOH — ECONOMIC STABILITY: FOOD INSECURITY: WITHIN THE PAST 12 MONTHS, YOU WORRIED THAT YOUR FOOD WOULD RUN OUT BEFORE YOU GOT MONEY TO BUY MORE.: NEVER TRUE

## 2025-05-06 SDOH — ECONOMIC STABILITY: FOOD INSECURITY: WITHIN THE PAST 12 MONTHS, THE FOOD YOU BOUGHT JUST DIDN'T LAST AND YOU DIDN'T HAVE MONEY TO GET MORE.: NEVER TRUE

## 2025-05-06 ASSESSMENT — PATIENT HEALTH QUESTIONNAIRE - PHQ9
SUM OF ALL RESPONSES TO PHQ QUESTIONS 1-9: 0
1. LITTLE INTEREST OR PLEASURE IN DOING THINGS: NOT AT ALL
2. FEELING DOWN, DEPRESSED OR HOPELESS: NOT AT ALL
SUM OF ALL RESPONSES TO PHQ QUESTIONS 1-9: 0

## 2025-05-06 ASSESSMENT — ENCOUNTER SYMPTOMS
SHORTNESS OF BREATH: 0
CHEST TIGHTNESS: 0
COUGH: 1

## 2025-05-06 NOTE — PROGRESS NOTES
History of present illness:Tati Prado is a 50 y.o. female presenting for follow up appointment    Ms. Prado is here with Mom who is concerned with Tati's weight gain.    Diet:   Difficult breakfast waffles, sausage link, fruit, hot chocolate orange juice  Lunch/Dinner protein, starch and vegetable    Exercise:   Walking 10k or more steps per day  Previously going to exercise classes at the gym with mom.    Review of Systems   Constitutional:  Negative for fatigue and fever.   Respiratory:  Positive for cough (Chronic). Negative for chest tightness and shortness of breath.    Cardiovascular:  Negative for chest pain and palpitations.   Neurological:  Negative for dizziness and headaches.           Past Medical History:   Diagnosis Date    Headache     Moderate mental retardation      Past Surgical History:   Procedure Laterality Date    COLONOSCOPY       Family History   Problem Relation Age of Onset    Arthritis Mother     High Blood Pressure Mother     High Cholesterol Mother     Unknown Father     Arthritis Father     Heart Attack Maternal Grandmother     Heart Disease Maternal Grandmother     High Blood Pressure Maternal Grandmother     Alcohol Abuse Maternal Aunt     Mental Illness Maternal Aunt        Prior to Admission medications    Medication Sig Start Date End Date Taking? Authorizing Provider   vitamin D (CHOLECALCIFEROL) 25 MCG (1000 UT) TABS tablet Take 2 tablets by mouth daily 6/20/23  Yes Roberta Tamayo MD   levonorgestrel (MIRENA) IUD 52 mg 1 Device by IntraUTERine route once   Yes Automatic Reconciliation, Ar        No Known Allergies    Vitals:    05/06/25 0840   BP: 101/67   BP Site: Left Upper Arm   Patient Position: Sitting   BP Cuff Size: Medium Adult   Pulse: 81   Resp: 16   Temp: 97.8 °F (36.6 °C)   TempSrc: Oral   SpO2: 96%   Weight: 63.5 kg (140 lb)   Height: 1.499 m (4' 11\")     Body mass index is 28.28 kg/m².      Objective  Physical Exam  Vitals and nursing note reviewed.

## 2025-05-06 NOTE — PROGRESS NOTES
Chief Complaint   Patient presents with    Follow-up    Dysarthria     Have you been to the ER, urgent care clinic since your last visit?  Hospitalized since your last visit?   NO    Have you seen or consulted any other health care providers outside our system since your last visit?   NO    Have you had a mammogram?”   No - May 29th    Date of last Mammogram: 4/29/2022      “Have you had a pap smear?”    NO-May 29th    Date of last Cervical Cancer screen (HPV or PAP): 1/16/2020